# Patient Record
Sex: FEMALE | Employment: UNEMPLOYED | URBAN - METROPOLITAN AREA
[De-identification: names, ages, dates, MRNs, and addresses within clinical notes are randomized per-mention and may not be internally consistent; named-entity substitution may affect disease eponyms.]

---

## 2020-12-07 ENCOUNTER — RECORDS - HEALTHEAST (OUTPATIENT)
Dept: LAB | Facility: CLINIC | Age: 65
End: 2020-12-07

## 2020-12-08 LAB
ANION GAP SERPL CALCULATED.3IONS-SCNC: 11 MMOL/L (ref 5–18)
BNP SERPL-MCNC: 21 PG/ML (ref 0–101)
BUN SERPL-MCNC: 11 MG/DL (ref 8–22)
CALCIUM SERPL-MCNC: 8.4 MG/DL (ref 8.5–10.5)
CHLORIDE BLD-SCNC: 98 MMOL/L (ref 98–107)
CO2 SERPL-SCNC: 29 MMOL/L (ref 22–31)
CREAT SERPL-MCNC: 0.82 MG/DL (ref 0.6–1.1)
GFR SERPL CREATININE-BSD FRML MDRD: >60 ML/MIN/1.73M2
GLUCOSE BLD-MCNC: 278 MG/DL (ref 70–125)
HBA1C MFR BLD: 8.2 %
POTASSIUM BLD-SCNC: 3.7 MMOL/L (ref 3.5–5)
SODIUM SERPL-SCNC: 138 MMOL/L (ref 136–145)

## 2021-03-23 ENCOUNTER — RECORDS - HEALTHEAST (OUTPATIENT)
Dept: LAB | Facility: CLINIC | Age: 66
End: 2021-03-23

## 2021-03-24 LAB
ANION GAP SERPL CALCULATED.3IONS-SCNC: 13 MMOL/L (ref 5–18)
BNP SERPL-MCNC: 15 PG/ML (ref 0–101)
BUN SERPL-MCNC: 20 MG/DL (ref 8–22)
CALCIUM SERPL-MCNC: 8.6 MG/DL (ref 8.5–10.5)
CHLORIDE BLD-SCNC: 96 MMOL/L (ref 98–107)
CO2 SERPL-SCNC: 28 MMOL/L (ref 22–31)
CREAT SERPL-MCNC: 1.12 MG/DL (ref 0.6–1.1)
GFR SERPL CREATININE-BSD FRML MDRD: 49 ML/MIN/1.73M2
GLUCOSE BLD-MCNC: 232 MG/DL (ref 70–125)
POTASSIUM BLD-SCNC: 3.7 MMOL/L (ref 3.5–5)
SODIUM SERPL-SCNC: 137 MMOL/L (ref 136–145)

## 2021-04-08 ENCOUNTER — RECORDS - HEALTHEAST (OUTPATIENT)
Dept: LAB | Facility: CLINIC | Age: 66
End: 2021-04-08

## 2021-04-09 LAB
ANION GAP SERPL CALCULATED.3IONS-SCNC: 11 MMOL/L (ref 5–18)
BUN SERPL-MCNC: 20 MG/DL (ref 8–22)
CALCIUM SERPL-MCNC: 8.8 MG/DL (ref 8.5–10.5)
CHLORIDE BLD-SCNC: 95 MMOL/L (ref 98–107)
CO2 SERPL-SCNC: 33 MMOL/L (ref 22–31)
CREAT SERPL-MCNC: 1.16 MG/DL (ref 0.6–1.1)
GFR SERPL CREATININE-BSD FRML MDRD: 47 ML/MIN/1.73M2
GLUCOSE BLD-MCNC: 210 MG/DL (ref 70–125)
POTASSIUM BLD-SCNC: 3.2 MMOL/L (ref 3.5–5)
SODIUM SERPL-SCNC: 139 MMOL/L (ref 136–145)

## 2021-05-23 ENCOUNTER — RECORDS - HEALTHEAST (OUTPATIENT)
Dept: LAB | Facility: CLINIC | Age: 66
End: 2021-05-23

## 2021-05-24 LAB
ANION GAP SERPL CALCULATED.3IONS-SCNC: 14 MMOL/L (ref 5–18)
BUN SERPL-MCNC: 22 MG/DL (ref 8–22)
CALCIUM SERPL-MCNC: 8.2 MG/DL (ref 8.5–10.5)
CHLORIDE BLD-SCNC: 93 MMOL/L (ref 98–107)
CO2 SERPL-SCNC: 34 MMOL/L (ref 22–31)
CREAT SERPL-MCNC: 1.21 MG/DL (ref 0.6–1.1)
GFR SERPL CREATININE-BSD FRML MDRD: 45 ML/MIN/1.73M2
GLUCOSE BLD-MCNC: 126 MG/DL (ref 70–125)
POTASSIUM BLD-SCNC: 2.9 MMOL/L (ref 3.5–5)
SODIUM SERPL-SCNC: 141 MMOL/L (ref 136–145)

## 2021-06-16 ENCOUNTER — RECORDS - HEALTHEAST (OUTPATIENT)
Dept: LAB | Facility: CLINIC | Age: 66
End: 2021-06-16

## 2021-06-17 LAB
ANION GAP SERPL CALCULATED.3IONS-SCNC: 12 MMOL/L (ref 5–18)
BUN SERPL-MCNC: 16 MG/DL (ref 8–22)
CALCIUM SERPL-MCNC: 8.5 MG/DL (ref 8.5–10.5)
CHLORIDE BLD-SCNC: 99 MMOL/L (ref 98–107)
CO2 SERPL-SCNC: 29 MMOL/L (ref 22–31)
CREAT SERPL-MCNC: 0.95 MG/DL (ref 0.6–1.1)
GFR SERPL CREATININE-BSD FRML MDRD: 59 ML/MIN/1.73M2
GLUCOSE BLD-MCNC: 184 MG/DL (ref 70–125)
POTASSIUM BLD-SCNC: 3.4 MMOL/L (ref 3.5–5)
SODIUM SERPL-SCNC: 140 MMOL/L (ref 136–145)

## 2021-06-28 ENCOUNTER — RECORDS - HEALTHEAST (OUTPATIENT)
Dept: LAB | Facility: CLINIC | Age: 66
End: 2021-06-28

## 2021-06-28 LAB
ALBUMIN UR-MCNC: NEGATIVE G/DL
APPEARANCE UR: CLEAR
BACTERIA #/AREA URNS HPF: ABNORMAL /[HPF]
BILIRUB UR QL STRIP: NEGATIVE
COLOR UR AUTO: COLORLESS
GLUCOSE UR STRIP-MCNC: NEGATIVE MG/DL
HGB UR QL STRIP: NEGATIVE
HYALINE CASTS: 14 LPF
KETONES UR STRIP-MCNC: NEGATIVE MG/DL
LEUKOCYTE ESTERASE UR QL STRIP: ABNORMAL
MUCOUS THREADS #/AREA URNS LPF: PRESENT LPF
NITRATE UR QL: NEGATIVE
PH UR STRIP: 5 [PH] (ref 5–8)
RBC URINE: 5 HPF
SP GR UR STRIP: 1.01 (ref 1–1.03)
SQUAMOUS EPITHELIAL: 1 /HPF
UROBILINOGEN UR STRIP-ACNC: ABNORMAL
WBC URINE: 34 HPF

## 2021-07-01 LAB
BACTERIA SPEC CULT: ABNORMAL
BACTERIA SPEC CULT: ABNORMAL

## 2021-07-13 ENCOUNTER — LAB REQUISITION (OUTPATIENT)
Dept: LAB | Facility: CLINIC | Age: 66
End: 2021-07-13
Payer: COMMERCIAL

## 2021-07-13 DIAGNOSIS — N39.0 URINARY TRACT INFECTION, SITE NOT SPECIFIED: ICD-10-CM

## 2021-07-13 PROCEDURE — 87086 URINE CULTURE/COLONY COUNT: CPT | Mod: ORL | Performed by: FAMILY MEDICINE

## 2021-07-14 LAB — BACTERIA UR CULT: NO GROWTH

## 2023-11-14 ENCOUNTER — TRANSITIONAL CARE UNIT VISIT (OUTPATIENT)
Dept: GERIATRICS | Facility: CLINIC | Age: 68
End: 2023-11-14
Payer: COMMERCIAL

## 2023-11-14 ENCOUNTER — LAB REQUISITION (OUTPATIENT)
Dept: LAB | Facility: CLINIC | Age: 68
End: 2023-11-14
Payer: COMMERCIAL

## 2023-11-14 VITALS
RESPIRATION RATE: 18 BRPM | WEIGHT: 293 LBS | HEIGHT: 55 IN | OXYGEN SATURATION: 94 % | DIASTOLIC BLOOD PRESSURE: 55 MMHG | TEMPERATURE: 97.2 F | BODY MASS INDEX: 67.81 KG/M2 | HEART RATE: 81 BPM | SYSTOLIC BLOOD PRESSURE: 120 MMHG

## 2023-11-14 DIAGNOSIS — E11.52 TYPE 2 DIABETES MELLITUS WITH DIABETIC PERIPHERAL ANGIOPATHY WITH GANGRENE, UNSPECIFIED WHETHER LONG TERM INSULIN USE (H): ICD-10-CM

## 2023-11-14 DIAGNOSIS — I10 ESSENTIAL (PRIMARY) HYPERTENSION: ICD-10-CM

## 2023-11-14 DIAGNOSIS — E11.9 TYPE 2 DIABETES MELLITUS WITHOUT COMPLICATIONS (H): ICD-10-CM

## 2023-11-14 DIAGNOSIS — J44.9 CHRONIC OBSTRUCTIVE PULMONARY DISEASE, UNSPECIFIED COPD TYPE (H): ICD-10-CM

## 2023-11-14 DIAGNOSIS — Z89.512 STATUS POST BELOW-KNEE AMPUTATION OF LEFT LOWER EXTREMITY (H): ICD-10-CM

## 2023-11-14 DIAGNOSIS — I96 GANGRENE OF LEFT FOOT (H): Primary | ICD-10-CM

## 2023-11-14 DIAGNOSIS — I73.9 PAD (PERIPHERAL ARTERY DISEASE) (H): ICD-10-CM

## 2023-11-14 DIAGNOSIS — I50.30 HEART FAILURE WITH PRESERVED EJECTION FRACTION, NYHA CLASS II (H): ICD-10-CM

## 2023-11-14 PROBLEM — L03.116 CELLULITIS OF LEFT LOWER EXTREMITY: Status: ACTIVE | Noted: 2017-05-04

## 2023-11-14 PROBLEM — Z86.711 HX PULMONARY EMBOLISM: Status: ACTIVE | Noted: 2021-07-12

## 2023-11-14 PROBLEM — E11.621 DIABETIC FOOT ULCER (H): Status: ACTIVE | Noted: 2018-03-08

## 2023-11-14 PROBLEM — Z59.86 PATIENT CANNOT AFFORD MEDICATIONS: Status: ACTIVE | Noted: 2020-10-25

## 2023-11-14 PROBLEM — R53.1 GENERALIZED WEAKNESS: Status: ACTIVE | Noted: 2020-10-25

## 2023-11-14 PROBLEM — Z78.9 HEALTH CARE HOME, ACTIVE CARE COORDINATION: Chronic | Status: ACTIVE | Noted: 2021-09-23

## 2023-11-14 PROBLEM — I89.0 LYMPHEDEMA: Status: ACTIVE | Noted: 2019-11-22

## 2023-11-14 PROBLEM — R29.6 FREQUENT FALLS: Status: ACTIVE | Noted: 2021-07-12

## 2023-11-14 PROBLEM — E11.42 DM TYPE 2 WITH DIABETIC PERIPHERAL NEUROPATHY (H): Status: ACTIVE | Noted: 2021-07-12

## 2023-11-14 PROBLEM — F50.811 BINGE-EATING DISORDER, MODERATE: Status: ACTIVE | Noted: 2017-02-21

## 2023-11-14 PROBLEM — A41.9 SEPSIS (H): Status: ACTIVE | Noted: 2023-11-01

## 2023-11-14 PROBLEM — I26.99 PULMONARY EMBOLISM ON RIGHT (H): Status: ACTIVE | Noted: 2020-08-17

## 2023-11-14 PROBLEM — L03.90 CELLULITIS: Status: ACTIVE | Noted: 2023-11-01

## 2023-11-14 PROBLEM — L97.919: Status: ACTIVE | Noted: 2021-07-21

## 2023-11-14 PROBLEM — L97.509 DIABETIC FOOT ULCER (H): Status: ACTIVE | Noted: 2018-03-08

## 2023-11-14 PROBLEM — R80.9 MODERATELY INCREASED ALBUMINURIA: Status: ACTIVE | Noted: 2017-11-29

## 2023-11-14 PROBLEM — I70.222 CRITICAL LIMB ISCHEMIA OF LEFT LOWER EXTREMITY (H): Status: ACTIVE | Noted: 2023-11-05

## 2023-11-14 PROBLEM — A41.01 MSSA (METHICILLIN SUSCEPTIBLE STAPHYLOCOCCUS AUREUS) SEPTICEMIA (H): Status: ACTIVE | Noted: 2018-03-24

## 2023-11-14 PROBLEM — M86.9: Status: ACTIVE | Noted: 2018-03-28

## 2023-11-14 PROBLEM — U07.1 COVID-19: Status: ACTIVE | Noted: 2020-08-06

## 2023-11-14 PROBLEM — H26.9 CATARACT, BILATERAL: Status: ACTIVE | Noted: 2017-02-27

## 2023-11-14 PROBLEM — L03.119 CELLULITIS OF FOOT: Status: ACTIVE | Noted: 2018-03-29

## 2023-11-14 PROBLEM — M20.42 HAMMER TOE OF LEFT FOOT: Status: ACTIVE | Noted: 2018-10-05

## 2023-11-14 PROBLEM — I83.019: Status: ACTIVE | Noted: 2021-07-21

## 2023-11-14 PROBLEM — Z59.48 LACK OF ACCESS TO ADEQUATE FOOD: Status: ACTIVE | Noted: 2017-12-04

## 2023-11-14 PROBLEM — E13.69: Status: ACTIVE | Noted: 2018-03-28

## 2023-11-14 PROBLEM — J96.01 ACUTE RESPIRATORY FAILURE WITH HYPOXIA (H): Status: ACTIVE | Noted: 2020-08-17

## 2023-11-14 PROCEDURE — 99310 SBSQ NF CARE HIGH MDM 45: CPT | Performed by: NURSE PRACTITIONER

## 2023-11-14 RX ORDER — ACETAMINOPHEN 500 MG
1000 TABLET ORAL 3 TIMES DAILY
COMMUNITY

## 2023-11-14 RX ORDER — AMOXICILLIN 250 MG
1 CAPSULE ORAL 2 TIMES DAILY PRN
COMMUNITY
Start: 2023-11-13

## 2023-11-14 RX ORDER — INSULIN ASPART 100 [IU]/ML
INJECTION, SOLUTION INTRAVENOUS; SUBCUTANEOUS
COMMUNITY
Start: 2023-10-13 | End: 2023-11-20

## 2023-11-14 RX ORDER — BACITRACIN ZINC 500 [USP'U]/G
OINTMENT TOPICAL DAILY
COMMUNITY
Start: 2023-01-18 | End: 2023-11-14

## 2023-11-14 RX ORDER — POTASSIUM CHLORIDE 1500 MG/1
1 TABLET, EXTENDED RELEASE ORAL DAILY
COMMUNITY
Start: 2023-09-29 | End: 2023-12-18

## 2023-11-14 RX ORDER — ALBUTEROL SULFATE 90 UG/1
2 AEROSOL, METERED RESPIRATORY (INHALATION)
COMMUNITY
Start: 2022-04-20

## 2023-11-14 RX ORDER — HYDROCHLOROTHIAZIDE 25 MG/1
1 TABLET ORAL DAILY
COMMUNITY
Start: 2022-03-14

## 2023-11-14 RX ORDER — BENZONATATE 100 MG/1
1 CAPSULE ORAL 3 TIMES DAILY PRN
COMMUNITY
Start: 2023-09-29 | End: 2023-11-14

## 2023-11-14 RX ORDER — OXYCODONE HYDROCHLORIDE 5 MG/1
2.5 TABLET ORAL EVERY 4 HOURS PRN
COMMUNITY
Start: 2023-11-11 | End: 2023-11-15 | Stop reason: DRUGHIGH

## 2023-11-14 RX ORDER — NUTRITIONAL SUPPLEMENT
1 PACKET (EA) ORAL 3 TIMES DAILY
COMMUNITY

## 2023-11-14 RX ORDER — HYDROXYZINE PAMOATE 25 MG/1
25 CAPSULE ORAL EVERY 4 HOURS PRN
COMMUNITY
Start: 2023-11-11 | End: 2023-11-28

## 2023-11-14 RX ORDER — ATORVASTATIN CALCIUM 80 MG/1
40 TABLET, FILM COATED ORAL DAILY
COMMUNITY
Start: 2023-03-03

## 2023-11-14 RX ORDER — POLYETHYLENE GLYCOL 3350 17 G/17G
17 POWDER, FOR SOLUTION ORAL
COMMUNITY

## 2023-11-14 RX ORDER — ACETAMINOPHEN 325 MG/1
650 TABLET ORAL
COMMUNITY
Start: 2022-04-20 | End: 2023-11-14

## 2023-11-14 RX ORDER — LOPERAMIDE HCL 2 MG
2 CAPSULE ORAL
COMMUNITY
Start: 2023-11-05

## 2023-11-14 RX ORDER — NYSTATIN 100000 [USP'U]/G
POWDER TOPICAL
COMMUNITY
Start: 2023-11-13

## 2023-11-14 RX ORDER — FUROSEMIDE 20 MG
20 TABLET ORAL 2 TIMES DAILY
COMMUNITY
Start: 2022-09-20

## 2023-11-14 RX ORDER — BLOOD SUGAR DIAGNOSTIC
STRIP MISCELLANEOUS
COMMUNITY
Start: 2022-10-14

## 2023-11-14 RX ORDER — CLOPIDOGREL BISULFATE 75 MG/1
75 TABLET ORAL DAILY
COMMUNITY
Start: 2023-11-06

## 2023-11-14 RX ORDER — CARVEDILOL 12.5 MG/1
12.5 TABLET ORAL 2 TIMES DAILY WITH MEALS
COMMUNITY
Start: 2022-03-23

## 2023-11-14 NOTE — PROGRESS NOTES
"[facility] GERIATRICS    PRIMARY CARE PROVIDER AND CLINIC:  Lindsay Metz  Chief Complaint   Patient presents with    Hospital F/U      [place] Medical Record Number:  [id number]  Place of Service where encounter took place:  [facility] (TCU) [[id number]]    Nahed Courtney  is a 68 year old  (1955), admitted to the above facility from  [facility]. Hospital stay 11/8/2023 through 11/13/2023..     Her past medical history includes  Left gangrenous left heel s/p BKA (11/2023)  Type 2 diabetes  Peripheral artery disease   COPD   HFpEF      From 11/1/2023 to 11/5/2023, patient was hospitalized for left gangrenous heel wound.        On 11/8/2023, patient was readmitted for evaluation of left gangrenous foot wound and underwent a left BKA (11/9/2023).  She was placed on vancomycin and Zosyn and clindamycin.    Today pt was fatigued.  Denied pain.   BS elevated      CODE STATUS/ADVANCE DIRECTIVES DISCUSSION:  No Order  CPR/Full code   ALLERGIES:   Allergies   Allergen Reactions    Chicken-Derived Products (Egg) Nausea     Other Reaction(s): Gastrointestinal    Lisinopril Cough and Nausea and Vomiting     PN: Other reaction(s): Cough, Vomiting    Losartan Cough    Miconazole Dermatitis     PN: Other reaction(s): Dermatitis    Morphine And Related Other (See Comments)     \"felt like I was dying when I took it\"    Vicodin [Hydrocodone-Acetaminophen]     Egg White (Egg Protein)      Other Reaction(s): Flu Like Symptoms    Hydrocodone-Acetaminophen Anxiety      PAST MEDICAL HISTORY: No past medical history on file.   PAST SURGICAL HISTORY:   has a past surgical history that includes IR Lower Extremity Angiogram Left (3/28/2018).  FAMILY HISTORY: family history is not on file.  SOCIAL HISTORY:   reports that she has quit smoking. She does not have any smokeless tobacco history on file. She reports that she does not drink alcohol.  Patient's living condition: lives " alone    Post Discharge Medication Reconciliation Status:   MED REC REQUIRED  Post Medication Reconciliation Status:   no changes       Current Outpatient Medications   Medication Sig    acetaminophen (TYLENOL) 500 MG tablet Take 1,000 mg by mouth every 6 hours as needed    albuterol (PROAIR HFA/PROVENTIL HFA/VENTOLIN HFA) 108 (90 Base) MCG/ACT inhaler Inhale 2 puffs into the lungs    aspirin (ASPIR-81) 81 MG EC tablet Take 81 mg by mouth daily.    atorvastatin (LIPITOR) 80 MG tablet Take 40 mg by mouth daily    blood glucose (ONETOUCH VERIO IQ) test strip Test 4 times per day ** Pharmacy allowed to substitute per Patients Insurance    carvedilol (COREG) 12.5 MG tablet Take 12.5 mg by mouth 2 times daily (with meals)    clopidogrel (PLAVIX) 75 MG tablet Take 75 mg by mouth daily    Fluticasone-Umeclidin-Vilant (TRELEGY ELLIPTA) 100-62.5-25 MCG/ACT oral inhaler Inhale 1 Inhalation into the lungs daily    furosemide (LASIX) 20 MG tablet Take 1 tablet by mouth daily    gabapentin (NEURONTIN) 600 MG tablet Take 600 mg by mouth 3 times daily.    hydrochlorothiazide (HYDRODIURIL) 25 MG tablet Take 1 tablet by mouth daily    hydrOXYzine (VISTARIL) 25 MG capsule Take 25 mg by mouth every 4 hours as needed for anxiety    Insulin Aspart FlexPen 100 UNIT/ML SOPN Inject Subcutaneous 3 times daily (with meals) f 0 - 119 = 0 units;   120 - 149 = 1 unit;   150 - 199 = 2 units;   200 - 249 = 4 units;   250 - 299 = 6 Units;   300 - 349 = 8 units;   350 - 999 = 10 units    At HS   0 - 149 = 0 units;   150 - 199 = 1 unit;   200 - 249 = 2 units;   250 - 299 = 3 units;   300 - 349 = 4 units;   350 - 999 = 5 units    insulin glargine (LANTUS PEN) 100 UNIT/ML pen Inject 40 Units Subcutaneous at bedtime    loperamide (IMODIUM) 2 MG capsule Take 2 mg by mouth    Multiple Vitamins-Minerals (JUAN MULTI WOMEN) TBCR Take  by mouth daily.    nitroglycerin (NITROSTAT) 0.4 MG SL tablet Place  under the tongue every 5 minutes as needed.    nystatin  "(MYCOSTATIN) 989235 UNIT/GM external powder Apply to skin twice a day.    oxyCODONE (ROXICODONE) 5 MG tablet Take 2.5 mg by mouth every 4 hours as needed for breakthrough pain    polyethylene glycol (MIRALAX) 17 g packet Take 17 g by mouth    potassium chloride ER (KLOR-CON M) 20 MEQ CR tablet Take 1 tablet by mouth daily    semaglutide (OZEMPIC) 2 MG/3ML pen 0.25 mg weekly on Sundays until seen by endocrinology   carrie for patient to use home dose of Ozempic.    senna-docusate (SENOKOT-S/PERICOLACE) 8.6-50 MG tablet Take 1 tablet by mouth 2 times daily as needed for constipation     No current facility-administered medications for this visit.       ROS:  4 point ROS including Respiratory, CV, GI and , other than that noted in the HPI,  is negative    Vitals:  /55   Pulse 81   Temp 97.2  F (36.2  C)   Resp 18   Ht (!) 0.14 m (5.5\")   Wt 140.7 kg (310 lb 3.2 oz)   SpO2 94%   BMI 7209.73 kg/m    Exam:  GENERAL APPEARANCE:  in no distress, morbidly obese, groggy  RESP:  lungs clear to auscultation , no respiratory distress  CV:  Palpation and auscultation of heart done , rate-normal  PSYCH:  oriented X 3    Lab/Diagnostic data:          ASSESSMENT/PLAN:    (I96) Gangrene of left foot (H)  (primary encounter diagnosis)  (Z89.512) Status post below-knee amputation of left lower extremity (H)  Comment: Occurring in 11/2023.  Received antibiotics after the surgery.  On 1/11/2023 developed fever, blood cultures negative.   Continue atorvastatin, Plavix and aspirin.     For pain she has oxycodone and hydroxyzine for anxiety and itching.    Plan:   Non weight bearing  Ok to shower. Keep splint and/or incision site clean, dry, intact. Recommend using press'n seal cling wrap to wrap around splint or a shower guard to cover your incision in order to keep dry. Do not immerse operative extremity in water.   Elevate left leg as needed.   Change dressing to left BKA every other day and PRN. Use adaptic and dry gauze "   Clamshell protector to left lower extremity. Remove protector and roll down stocking 3 times daily and as needed to check skin integrity.   Follow up appointment will be with [facility] on November 29 at 1:30 pm in [place] at [address], [address], [address], [place], [place] with Dr. Hamilton. Please call [phone] with questions.   Therapy to evaluate and treat   CBC, BMP, hgb A1c on 11/15  Pt having lots of fatigue with oxycodone therefore will add tylenol and lengthen the time it can be given.     (E11.52) Type 2 diabetes mellitus with diabetic peripheral angiopathy with gangrene, unspecified whether long term insulin use (H)  Comment: Chronic.  Currently taking Semaglutide, insulin Lantus and aspart with sliding scale.   For secondary prevention she is taking aspirin and statin,  she is allergic to ACEI and ARBS.    Plan: follow up with endocrinology in 4 weeks  Hgb A1c  Pt having elevated BS.  May need to consider aspart at meals.  Was on Glargine 48 units Prior to hospital admission      (I73.9) PAD (peripheral artery disease) (H24)  Comment: chronic.   Currently taking Plavix and aspirin  Plan: Continue with plan of care no changes at this time, adjustment as needed      (J44.9) Chronic obstructive pulmonary disease, unspecified COPD type (H)  Comment: chronic.  Currently taking inhalers  Plan: Continue with plan of care no changes at this time, adjustment as needed      (I50.30) Heart failure with preserved ejection fraction, NYHA class II (H)  Comment: Chronic.   Currently taking furosemide  Plan: Daily weight  BMP          Electronically signed by:      ALONZO Romero CNP on 11/14/2023 at 7:07 PM        45 minutes was spent reviewing medical record from [facility], assessing patient, reviewing medical notes from previous primary care provider, talking with pt and answering their questions/concerns, coordinating above plan of care with nursing , SW,  therapy and dietitian and patient and reviewed medications with patient and counseled pt. on above plan of care.  Counseled on medications and side effects.

## 2023-11-14 NOTE — LETTER
"    11/14/2023        RE: Nahed Courtney  [address]  [address]  [place] [address]        [facility] GERIATR[facility]    PRIMARY CARE PROVIDER AND CLINIC:  Lindsay Metz  Chief Complaint   Patient presents with     Hospital F/U      [place] Medical Record Number:  [id number]  Place of Service where encounter took place:  [facility] (U) [[id number]]    Nahed Courtney  is a 68 year old  (1955), admitted to the above facility from  [facility]. Hospital stay 11/8/2023 through 11/13/2023..     Her past medical history includes  Left gangrenous left heel s/p BKA (11/2023)  Type 2 diabetes  Peripheral artery disease   COPD   HFpEF      From 11/1/2023 to 11/5/2023, patient was hospitalized for left gangrenous heel wound.        On 11/8/2023, patient was readmitted for evaluation of left gangrenous foot wound and underwent a left BKA (11/9/2023).  She was placed on vancomycin and Zosyn and clindamycin.    Today pt was fatigued.  Denied pain.   BS elevated      CODE STATUS/ADVANCE DIRECTIVES DISCUSSION:  No Order  CPR/Full code   ALLERGIES:   Allergies   Allergen Reactions     Chicken-Derived Products (Egg) Nausea     Other Reaction(s): Gastrointestinal     Lisinopril Cough and Nausea and Vomiting     PN: Other reaction(s): Cough, Vomiting     Losartan Cough     Miconazole Dermatitis     PN: Other reaction(s): Dermatitis     Morphine And Related Other (See Comments)     \"felt like I was dying when I took it\"     Vicodin [Hydrocodone-Acetaminophen]      Egg White (Egg Protein)      Other Reaction(s): Flu Like Symptoms     Hydrocodone-Acetaminophen Anxiety      PAST MEDICAL HISTORY: No past medical history on file.   PAST SURGICAL HISTORY:   has a past surgical history that includes IR Lower Extremity Angiogram Left (3/28/2018).  FAMILY HISTORY: family history is not on file.  SOCIAL HISTORY:   reports that she has quit smoking. She does not " have any smokeless tobacco history on file. She reports that she does not drink alcohol.  Patient's living condition: lives alone    Post Discharge Medication Reconciliation Status:   MED REC REQUIRED  Post Medication Reconciliation Status:   no changes       Current Outpatient Medications   Medication Sig     acetaminophen (TYLENOL) 500 MG tablet Take 1,000 mg by mouth every 6 hours as needed     albuterol (PROAIR HFA/PROVENTIL HFA/VENTOLIN HFA) 108 (90 Base) MCG/ACT inhaler Inhale 2 puffs into the lungs     aspirin (ASPIR-81) 81 MG EC tablet Take 81 mg by mouth daily.     atorvastatin (LIPITOR) 80 MG tablet Take 40 mg by mouth daily     blood glucose (ONETOUCH VERIO IQ) test strip Test 4 times per day ** Pharmacy allowed to substitute per Patients Insurance     carvedilol (COREG) 12.5 MG tablet Take 12.5 mg by mouth 2 times daily (with meals)     clopidogrel (PLAVIX) 75 MG tablet Take 75 mg by mouth daily     Fluticasone-Umeclidin-Vilant (TRELEGY ELLIPTA) 100-62.5-25 MCG/ACT oral inhaler Inhale 1 Inhalation into the lungs daily     furosemide (LASIX) 20 MG tablet Take 1 tablet by mouth daily     gabapentin (NEURONTIN) 600 MG tablet Take 600 mg by mouth 3 times daily.     hydrochlorothiazide (HYDRODIURIL) 25 MG tablet Take 1 tablet by mouth daily     hydrOXYzine (VISTARIL) 25 MG capsule Take 25 mg by mouth every 4 hours as needed for anxiety     Insulin Aspart FlexPen 100 UNIT/ML SOPN Inject Subcutaneous 3 times daily (with meals) f 0 - 119 = 0 units;   120 - 149 = 1 unit;   150 - 199 = 2 units;   200 - 249 = 4 units;   250 - 299 = 6 Units;   300 - 349 = 8 units;   350 - 999 = 10 units    At HS   0 - 149 = 0 units;   150 - 199 = 1 unit;   200 - 249 = 2 units;   250 - 299 = 3 units;   300 - 349 = 4 units;   350 - 999 = 5 units     insulin glargine (LANTUS PEN) 100 UNIT/ML pen Inject 40 Units Subcutaneous at bedtime     loperamide (IMODIUM) 2 MG capsule Take 2 mg by mouth     Multiple Vitamins-Minerals (JUAN MULTI  "WOMEN) TBCR Take  by mouth daily.     nitroglycerin (NITROSTAT) 0.4 MG SL tablet Place  under the tongue every 5 minutes as needed.     nystatin (MYCOSTATIN) 597195 UNIT/GM external powder Apply to skin twice a day.     oxyCODONE (ROXICODONE) 5 MG tablet Take 2.5 mg by mouth every 4 hours as needed for breakthrough pain     polyethylene glycol (MIRALAX) 17 g packet Take 17 g by mouth     potassium chloride ER (KLOR-CON M) 20 MEQ CR tablet Take 1 tablet by mouth daily     semaglutide (OZEMPIC) 2 MG/3ML pen 0.25 mg weekly on Sundays until seen by endocrinology   carrie for patient to use home dose of Ozempic.     senna-docusate (SENOKOT-S/PERICOLACE) 8.6-50 MG tablet Take 1 tablet by mouth 2 times daily as needed for constipation     No current facility-administered medications for this visit.       ROS:  4 point ROS including Respiratory, CV, GI and , other than that noted in the HPI,  is negative    Vitals:  /55   Pulse 81   Temp 97.2  F (36.2  C)   Resp 18   Ht (!) 0.14 m (5.5\")   Wt 140.7 kg (310 lb 3.2 oz)   SpO2 94%   BMI 7209.73 kg/m    Exam:  GENERAL APPEARANCE:  in no distress, morbidly obese, groggy  RESP:  lungs clear to auscultation , no respiratory distress  CV:  Palpation and auscultation of heart done , rate-normal  PSYCH:  oriented X 3    Lab/Diagnostic data:          ASSESSMENT/PLAN:    (I96) Gangrene of left foot (H)  (primary encounter diagnosis)  (Z89.512) Status post below-knee amputation of left lower extremity (H)  Comment: Occurring in 11/2023.  Received antibiotics after the surgery.  On 1/11/2023 developed fever, blood cultures negative.   Continue atorvastatin, Plavix and aspirin.     For pain she has oxycodone and hydroxyzine for anxiety and itching.    Plan:   Non weight bearing  Ok to shower. Keep splint and/or incision site clean, dry, intact. Recommend using press'n seal cling wrap to wrap around splint or a shower guard to cover your incision in order to keep dry. Do not " immerse operative extremity in water.   Elevate left leg as needed.   Change dressing to left BKA every other day and PRN. Use adaptic and dry gauze   Clamshell protector to left lower extremity. Remove protector and roll down stocking 3 times daily and as needed to check skin integrity.   Follow up appointment will be with [facility] on November 29 at 1:30 pm in [place] at [address], [address], [address], [place], [place] with Dr. Hamilton. Please call [phone] with questions.   Therapy to evaluate and treat   CBC, BMP, hgb A1c on 11/15  Pt having lots of fatigue with oxycodone therefore will add tylenol and lengthen the time it can be given.     (E11.52) Type 2 diabetes mellitus with diabetic peripheral angiopathy with gangrene, unspecified whether long term insulin use (H)  Comment: Chronic.  Currently taking Semaglutide, insulin Lantus and aspart with sliding scale.   For secondary prevention she is taking aspirin and statin,  she is allergic to ACEI and ARBS.    Plan: follow up with endocrinology in 4 weeks  Hgb A1c  Pt having elevated BS.  May need to consider aspart at meals.  Was on Glargine 48 units Prior to hospital admission      (I73.9) PAD (peripheral artery disease) (H24)  Comment: chronic.   Currently taking Plavix and aspirin  Plan: Continue with plan of care no changes at this time, adjustment as needed      (J44.9) Chronic obstructive pulmonary disease, unspecified COPD type (H)  Comment: chronic.  Currently taking inhalers  Plan: Continue with plan of care no changes at this time, adjustment as needed      (I50.30) Heart failure with preserved ejection fraction, NYHA class II (H)  Comment: Chronic.   Currently taking furosemide  Plan: Daily weight  BMP          Electronically signed by:      ALONZO Romero CNP on 11/14/2023 at 7:07 PM        45 minutes was spent reviewing medical record from [facility], assessing patient, reviewing medical notes  from previous primary care provider, talking with pt and answering their questions/concerns, coordinating above plan of care with nursing , SW, therapy and dietitian and patient and reviewed medications with patient and counseled pt. on above plan of care.  Counseled on medications and side effects.                    Sincerely,        ALONZO Romero CNP

## 2023-11-15 ENCOUNTER — DOCUMENTATION ONLY (OUTPATIENT)
Dept: GERIATRICS | Facility: CLINIC | Age: 68
End: 2023-11-15
Payer: COMMERCIAL

## 2023-11-15 DIAGNOSIS — M25.559 HIP JOINT PAIN: Primary | ICD-10-CM

## 2023-11-15 LAB
ANION GAP SERPL CALCULATED.3IONS-SCNC: 12 MMOL/L (ref 7–15)
BUN SERPL-MCNC: 15.5 MG/DL (ref 8–23)
CALCIUM SERPL-MCNC: 8.9 MG/DL (ref 8.8–10.2)
CHLORIDE SERPL-SCNC: 94 MMOL/L (ref 98–107)
CREAT SERPL-MCNC: 0.93 MG/DL (ref 0.51–0.95)
DEPRECATED HCO3 PLAS-SCNC: 32 MMOL/L (ref 22–29)
EGFRCR SERPLBLD CKD-EPI 2021: 67 ML/MIN/1.73M2
ERYTHROCYTE [DISTWIDTH] IN BLOOD BY AUTOMATED COUNT: 18.1 % (ref 10–15)
GLUCOSE SERPL-MCNC: 186 MG/DL (ref 70–99)
HBA1C MFR BLD: 9.2 %
HCT VFR BLD AUTO: 34.4 % (ref 35–47)
HGB BLD-MCNC: 10.5 G/DL (ref 11.7–15.7)
MCH RBC QN AUTO: 27.6 PG (ref 26.5–33)
MCHC RBC AUTO-ENTMCNC: 30.5 G/DL (ref 31.5–36.5)
MCV RBC AUTO: 91 FL (ref 78–100)
PLATELET # BLD AUTO: 334 10E3/UL (ref 150–450)
POTASSIUM SERPL-SCNC: 3.5 MMOL/L (ref 3.4–5.3)
RBC # BLD AUTO: 3.8 10E6/UL (ref 3.8–5.2)
SODIUM SERPL-SCNC: 138 MMOL/L (ref 135–145)
WBC # BLD AUTO: 9.8 10E3/UL (ref 4–11)

## 2023-11-15 PROCEDURE — P9604 ONE-WAY ALLOW PRORATED TRIP: HCPCS | Mod: ORL | Performed by: NURSE PRACTITIONER

## 2023-11-15 PROCEDURE — 80048 BASIC METABOLIC PNL TOTAL CA: CPT | Mod: ORL | Performed by: NURSE PRACTITIONER

## 2023-11-15 PROCEDURE — 36415 COLL VENOUS BLD VENIPUNCTURE: CPT | Mod: ORL | Performed by: NURSE PRACTITIONER

## 2023-11-15 PROCEDURE — 83036 HEMOGLOBIN GLYCOSYLATED A1C: CPT | Mod: ORL | Performed by: NURSE PRACTITIONER

## 2023-11-15 PROCEDURE — 85027 COMPLETE CBC AUTOMATED: CPT | Mod: ORL | Performed by: NURSE PRACTITIONER

## 2023-11-15 RX ORDER — OXYCODONE HYDROCHLORIDE 5 MG/1
TABLET ORAL
Qty: 60 TABLET | Refills: 0 | Status: SHIPPED | OUTPATIENT
Start: 2023-11-15 | End: 2023-12-04

## 2023-11-15 RX ORDER — OXYCODONE HYDROCHLORIDE 5 MG/1
TABLET ORAL
COMMUNITY
Start: 2023-11-15 | End: 2023-11-15

## 2023-11-16 ENCOUNTER — TRANSITIONAL CARE UNIT VISIT (OUTPATIENT)
Dept: GERIATRICS | Facility: CLINIC | Age: 68
End: 2023-11-16
Payer: COMMERCIAL

## 2023-11-16 VITALS
TEMPERATURE: 97.2 F | OXYGEN SATURATION: 94 % | RESPIRATION RATE: 18 BRPM | HEIGHT: 65 IN | BODY MASS INDEX: 48.82 KG/M2 | WEIGHT: 293 LBS | DIASTOLIC BLOOD PRESSURE: 65 MMHG | SYSTOLIC BLOOD PRESSURE: 129 MMHG | HEART RATE: 73 BPM

## 2023-11-16 DIAGNOSIS — Z79.4 LONG TERM (CURRENT) USE OF INSULIN (H): ICD-10-CM

## 2023-11-16 DIAGNOSIS — E11.52 TYPE 2 DIABETES MELLITUS WITH DIABETIC PERIPHERAL ANGIOPATHY AND GANGRENE, WITH LONG-TERM CURRENT USE OF INSULIN (H): Primary | ICD-10-CM

## 2023-11-16 DIAGNOSIS — Z79.4 TYPE 2 DIABETES MELLITUS WITH DIABETIC PERIPHERAL ANGIOPATHY AND GANGRENE, WITH LONG-TERM CURRENT USE OF INSULIN (H): Primary | ICD-10-CM

## 2023-11-16 DIAGNOSIS — R73.9 ELEVATED BLOOD SUGAR: ICD-10-CM

## 2023-11-16 PROCEDURE — 99309 SBSQ NF CARE MODERATE MDM 30: CPT | Performed by: NURSE PRACTITIONER

## 2023-11-16 NOTE — LETTER
"    11/16/2023        RE: Nahed Courtney  [address]  [facility] [address]        [facility]    Chief Complaint   Patient presents with     Blood Sugar     HPI:  Nahed Courtney is a 68 year old  (1955), who is being seen today for an episodic care visit at: [facility] ([facility]) [[id number]].     Pt's BS are elevated.    Weight is down 5 pounds since admission  Receives 40 units Lantus at bedtime and sliding scale with Novolog        Allergies, and PMH/PSH reviewed in [facility] today.  REVIEW OF SYSTEMS:  4 point ROS including Respiratory, CV, GI and , other than that noted in the HPI,  is negative    Objective:   /65   Pulse 73   Temp 97.2  F (36.2  C)   Resp 18   Ht 1.651 m (5' 5\")   Wt 138.6 kg (305 lb 8 oz)   SpO2 94%   BMI 50.84 kg/m    GENERAL APPEARANCE:  Alert, morbidly obese  RESP:  no respiratory distress  PSYCH:  normal insight, judgement and memory    Most Recent 3 CBC's:  Recent Labs   Lab Test 11/15/23  0611   WBC 9.8   HGB 10.5*   MCV 91        Most Recent 3 BMP's:  Recent Labs   Lab Test 11/15/23  0611 06/17/21  0730 05/24/21  0655    140 141   POTASSIUM 3.5 3.4* 2.9*   CHLORIDE 94* 99 93*   CO2 32* 29 34*   BUN 15.5 16 22   CR 0.93 0.95 1.21*   ANIONGAP 12 12 14   ROBINA 8.9 8.5 8.2*   * 184* 126*       Assessment/Plan:  (Z79.4) Long term (current) use of insulin (H)  (primary encounter diagnosis)  (E11.52,  Z79.4) Type 2 diabetes mellitus with diabetic peripheral angiopathy and gangrene, with long-term current use of insulin (H)  (R73.9) elevated blood sugar  Comment: Chronic.  Blood sugars elevated.  Currently taking Semaglutide (on hold during U admission), insulin Lantus and aspart with sliding scale.   For secondary prevention she is taking aspirin and statin,  she is allergic to ACEI and ARBS.    Plan:   Dr. Darya Endocrinology  12/19/23 @ 2:00 PM   Stop current Glargine 40 units at HS  Start Glargine 44 " units at HS  Start Was on Glargine 48 units Prior to hospital admission    MED REC REQUIRED  Post Medication Reconciliation Status:  Discharge medications reconciled, continue medications without change        Electronically signed by:      ALONZO Romero CNP on 11/16/2023 at 5:59 PM           Sincerely,        ALONZO Romero CNP

## 2023-11-16 NOTE — PROGRESS NOTES
"[facility] GERIATRICS    Chief Complaint   Patient presents with    Blood Sugar     HPI:  Nahed Courtney is a 68 year old  (1955), who is being seen today for an episodic care visit at: [facility] ([facility]) [[id number]].     Pt's BS are elevated.    Weight is down 5 pounds since admission  Receives 40 units Lantus at bedtime and sliding scale with Novolog        Allergies, and PMH[facility]PSH [facility].  REVIEW OF SYSTEMS:  4 point ROS including Respiratory, CV, GI and , other than that noted in the HPI,  is negative    Objective:   /65   Pulse 73   Temp 97.2  F (36.2  C)   Resp 18   Ht 1.651 m (5' 5\")   Wt 138.6 kg (305 lb 8 oz)   SpO2 94%   BMI 50.84 kg/m    GENERAL APPEARANCE:  Alert, morbidly obese  RESP:  no respiratory distress  PSYCH:  normal insight, judgement and memory    Most Recent 3 CBC's:  Recent Labs   Lab Test 11/15/23  0611   WBC 9.8   HGB 10.5*   MCV 91        Most Recent 3 BMP's:  Recent Labs   Lab Test 11/15/23  0611 06/17/21  0730 05/24/21  0655    140 141   POTASSIUM 3.5 3.4* 2.9*   CHLORIDE 94* 99 93*   CO2 32* 29 34*   BUN 15.5 16 22   CR 0.93 0.95 1.21*   ANIONGAP 12 12 14   ROBINA 8.9 8.5 8.2*   * 184* 126*       Assessment/Plan:  (Z79.4) Long term (current) use of insulin (H)  (primary encounter diagnosis)  (E11.52,  Z79.4) Type 2 diabetes mellitus with diabetic peripheral angiopathy and gangrene, with long-term current use of insulin (H)  (R73.9) elevated blood sugar  Comment: Chronic.  Blood sugars elevated.  Currently taking Semaglutide (on hold during U admission), insulin Lantus and aspart with sliding scale.   For secondary prevention she is taking aspirin and statin,  she is allergic to ACEI and ARBS.    Plan:   Dr. Lackey Endocrinology  12/19/23 @ 2:00 PM   Stop current Glargine 40 units at HS  Start Glargine 44 units at HS  Start Was on Glargine 48 units Prior to hospital admission    MED REC REQUIRED  Post Medication " Reconciliation Status:  Discharge medications reconciled, continue medications without change        Electronically signed by:      ALONZO Romero CNP on 11/16/2023 at 5:59 PM

## 2023-11-20 ENCOUNTER — TRANSITIONAL CARE UNIT VISIT (OUTPATIENT)
Dept: GERIATRICS | Facility: CLINIC | Age: 68
End: 2023-11-20
Payer: COMMERCIAL

## 2023-11-20 VITALS
TEMPERATURE: 97.2 F | HEART RATE: 79 BPM | HEIGHT: 65 IN | SYSTOLIC BLOOD PRESSURE: 152 MMHG | OXYGEN SATURATION: 95 % | WEIGHT: 293 LBS | DIASTOLIC BLOOD PRESSURE: 68 MMHG | RESPIRATION RATE: 18 BRPM | BODY MASS INDEX: 48.82 KG/M2

## 2023-11-20 DIAGNOSIS — Z79.4 TYPE 2 DIABETES MELLITUS WITH DIABETIC PERIPHERAL ANGIOPATHY AND GANGRENE, WITH LONG-TERM CURRENT USE OF INSULIN (H): Primary | ICD-10-CM

## 2023-11-20 DIAGNOSIS — R73.9 ELEVATED BLOOD SUGAR: ICD-10-CM

## 2023-11-20 DIAGNOSIS — G47.00 INSOMNIA, UNSPECIFIED TYPE: ICD-10-CM

## 2023-11-20 DIAGNOSIS — Z79.4 LONG TERM (CURRENT) USE OF INSULIN (H): ICD-10-CM

## 2023-11-20 DIAGNOSIS — Z89.512 STATUS POST BELOW-KNEE AMPUTATION OF LEFT LOWER EXTREMITY (H): ICD-10-CM

## 2023-11-20 DIAGNOSIS — E11.52 TYPE 2 DIABETES MELLITUS WITH DIABETIC PERIPHERAL ANGIOPATHY AND GANGRENE, WITH LONG-TERM CURRENT USE OF INSULIN (H): Primary | ICD-10-CM

## 2023-11-20 PROCEDURE — 99309 SBSQ NF CARE MODERATE MDM 30: CPT | Performed by: NURSE PRACTITIONER

## 2023-11-20 RX ORDER — LANOLIN ALCOHOL/MO/W.PET/CERES
3 CREAM (GRAM) TOPICAL AT BEDTIME
Start: 2023-11-20

## 2023-11-20 RX ORDER — INSULIN ASPART 100 [IU]/ML
10 INJECTION, SOLUTION INTRAVENOUS; SUBCUTANEOUS
Start: 2023-11-20 | End: 2023-11-28

## 2023-11-20 NOTE — LETTER
11/20/2023        RE: Nahed Courtney  [address]  [facility] [address]         [facility]        Visit Type: Blood Sugar      Facility:   [facility] ([facility]) [[id number]]         History of Present Illness: Nahed Courtney is a 68 year old female     Her past medical history includes  Left gangrenous left heel s/p BKA (11/2023)  Type 2 diabetes  Peripheral artery disease   COPD   HFpEF    BS remains elevated despite increasing insulin and giving her 5 units for each meal.   Semaglutide on hold while in facility.  She only took this one time prior to admission.  Weight decreasing   VS stable.    Pt would like her insulin increased    Current Outpatient Medications   Medication Sig Dispense Refill     acetaminophen (TYLENOL) 500 MG tablet Take 1,000 mg by mouth every 6 hours as needed       albuterol (PROAIR HFA/PROVENTIL HFA/VENTOLIN HFA) 108 (90 Base) MCG/ACT inhaler Inhale 2 puffs into the lungs       arginine (ARGINAID) PACK Take 1 packet by mouth 3 times daily       aspirin (ASPIR-81) 81 MG EC tablet Take 81 mg by mouth daily.       atorvastatin (LIPITOR) 80 MG tablet Take 40 mg by mouth daily       blood glucose (ONETOUCH VERIO IQ) test strip Test 4 times per day ** Pharmacy allowed to substitute per Patients Insurance       carvedilol (COREG) 12.5 MG tablet Take 12.5 mg by mouth 2 times daily (with meals)       clopidogrel (PLAVIX) 75 MG tablet Take 75 mg by mouth daily       Fluticasone-Umeclidin-Vilant (TRELEGY ELLIPTA) 100-62.5-25 MCG/ACT oral inhaler Inhale 1 Inhalation into the lungs daily       furosemide (LASIX) 20 MG tablet Take 1 tablet by mouth daily       gabapentin (NEURONTIN) 600 MG tablet Take 600 mg by mouth 3 times daily.       hydrochlorothiazide (HYDRODIURIL) 25 MG tablet Take 1 tablet by mouth daily       hydrOXYzine (VISTARIL) 25 MG capsule Take 25 mg by mouth every 4 hours as needed for anxiety       Insulin Aspart FlexPen 100  "UNIT/ML SOPN Inject Subcutaneous 3 times daily (with meals) f 0 - 119 = 0 units;   120 - 149 = 1 unit;   150 - 199 = 2 units;   200 - 249 = 4 units;   250 - 299 = 6 Units;   300 - 349 = 8 units;   350 - 999 = 10 units    At HS   0 - 149 = 0 units;   150 - 199 = 1 unit;   200 - 249 = 2 units;   250 - 299 = 3 units;   300 - 349 = 4 units;   350 - 999 = 5 units       insulin glargine (LANTUS PEN) 100 UNIT/ML pen Inject 44 Units Subcutaneous at bedtime       loperamide (IMODIUM) 2 MG capsule Take 2 mg by mouth       Multiple Vitamins-Minerals (JUAN MULTI WOMEN) TBCR Take  by mouth daily.       nitroglycerin (NITROSTAT) 0.4 MG SL tablet Place  under the tongue every 5 minutes as needed.       nystatin (MYCOSTATIN) 005682 UNIT/GM external powder Apply to skin twice a day.       oxyCODONE (ROXICODONE) 5 MG tablet Take 2.5mg Q 6 hours PRN x 5 days then 5mg Q 12 hours PRN thereafter. 60 tablet 0     polyethylene glycol (MIRALAX) 17 g packet Take 17 g by mouth       potassium chloride ER (KLOR-CON M) 20 MEQ CR tablet Take 1 tablet by mouth daily       semaglutide (OZEMPIC) 2 MG/3ML pen 0.25 mg weekly on Sundays until seen by endocrinology   carrie for patient to use home dose of Ozempic. (Patient not taking: Reported on 11/16/2023)       senna-docusate (SENOKOT-S/PERICOLACE) 8.6-50 MG tablet Take 1 tablet by mouth 2 times daily as needed for constipation         ALLERGIES:Chicken-derived products (egg), Lisinopril, Losartan, Miconazole, Morphine and related, Vicodin [hydrocodone-acetaminophen], Egg white (egg protein), and Hydrocodone-acetaminophen    Vitals:  BP (!) 152/68   Pulse 79   Temp 97.2  F (36.2  C)   Resp 18   Ht 1.651 m (5' 5\")   Wt 136.3 kg (300 lb 6.4 oz)   SpO2 95%   BMI 49.99 kg/m    Body mass index is 49.99 kg/m .    Review of Systems   All other systems reviewed and are negative.       Physical Exam  Vitals and nursing note reviewed.   Constitutional:       Appearance: She is obese.   Skin:       "   Neurological:      Mental Status: She is alert.           Labs:     Most Recent 3 CBC's:  Recent Labs   Lab Test 11/15/23  0611   WBC 9.8   HGB 10.5*   MCV 91        Most Recent 3 BMP's:  Recent Labs   Lab Test 11/15/23  0611 06/17/21  0730 05/24/21  0655    140 141   POTASSIUM 3.5 3.4* 2.9*   CHLORIDE 94* 99 93*   CO2 32* 29 34*   BUN 15.5 16 22   CR 0.93 0.95 1.21*   ANIONGAP 12 12 14   ROBINA 8.9 8.5 8.2*   * 184* 126*       Assessment/Plan:  (Z89.512) Status post below-knee amputation of left lower extremity (H)  Comment: Occurring in 11/2023.  Received antibiotics after the surgery.  On 1/11/2023 developed fever, blood cultures negative.   Continue atorvastatin, Plavix and aspirin.     For pain she has oxycodone and hydroxyzine for anxiety and itching.  Incision is CDI with staples intact  Plan: follow up on 11/29 at [facility] to see Dr. Faustin - writer expects staples out at that time.      (E11.52) Type 2 diabetes mellitus with diabetic peripheral angiopathy with gangrene, unspecified whether long term insulin use (H)  (R73.9) elevated blood sugar  (Z79.4) long-term use of insulin  Comment: Chronic.  Currently taking Semaglutide, insulin Lantus and aspart with sliding scale.   For secondary prevention she is taking aspirin and statin,  she is allergic to ACEI and ARBS.    Plan: follow up with Dr. Lackey Endocrinology Date & Time of Appointment: 12/19/23 @ 2:00 PM  Increase Glargine to 48 units  Increase meal time aspart to 10 units TID   Continue with sliding scale  Continue with blood sugar checks.       (G47.00) insomnia, unspecified type  Comment: Patient having difficulty remaining asleep  Plan: Start melatonin 3 mg daily    Electronically signed by:      ALONZO Romero CNP on 11/20/2023 at 12:54 PM    MEDICATIONS:  MED REC REQUIRED  Post Medication Reconciliation Status:  Medication reconciliation previously completed during another office visit                Sincerely,        ALONZO Romero CNP

## 2023-11-20 NOTE — PROGRESS NOTES
[facility]        Visit Type: Blood Sugar      Facility:   [facility] ([facility]) [[id number]]         History of Present Illness: Nahed Courtney is a 68 year old female     Her past medical history includes  Left gangrenous left heel s/p BKA (11/2023)  Type 2 diabetes  Peripheral artery disease   COPD   HFpEF    BS remains elevated despite increasing insulin and giving her 5 units for each meal.   Semaglutide on hold while in facility.  She only took this one time prior to admission.  Weight decreasing   VS stable.    Pt would like her insulin increased    Current Outpatient Medications   Medication Sig Dispense Refill    acetaminophen (TYLENOL) 500 MG tablet Take 1,000 mg by mouth every 6 hours as needed      albuterol (PROAIR HFA/PROVENTIL HFA/VENTOLIN HFA) 108 (90 Base) MCG/ACT inhaler Inhale 2 puffs into the lungs      arginine (ARGINAID) PACK Take 1 packet by mouth 3 times daily      aspirin (ASPIR-81) 81 MG EC tablet Take 81 mg by mouth daily.      atorvastatin (LIPITOR) 80 MG tablet Take 40 mg by mouth daily      blood glucose (ONETOUCH VERIO IQ) test strip Test 4 times per day ** Pharmacy allowed to substitute per Patients Insurance      carvedilol (COREG) 12.5 MG tablet Take 12.5 mg by mouth 2 times daily (with meals)      clopidogrel (PLAVIX) 75 MG tablet Take 75 mg by mouth daily      Fluticasone-Umeclidin-Vilant (TRELEGY ELLIPTA) 100-62.5-25 MCG/ACT oral inhaler Inhale 1 Inhalation into the lungs daily      furosemide (LASIX) 20 MG tablet Take 1 tablet by mouth daily      gabapentin (NEURONTIN) 600 MG tablet Take 600 mg by mouth 3 times daily.      hydrochlorothiazide (HYDRODIURIL) 25 MG tablet Take 1 tablet by mouth daily      hydrOXYzine (VISTARIL) 25 MG capsule Take 25 mg by mouth every 4 hours as needed for anxiety      Insulin Aspart FlexPen 100 UNIT/ML SOPN Inject Subcutaneous 3 times daily (with meals) f 0 - 119 = 0 units;   120 - 149 = 1 unit;   150 - 199 = 2 units;    "200 - 249 = 4 units;   250 - 299 = 6 Units;   300 - 349 = 8 units;   350 - 999 = 10 units    At HS   0 - 149 = 0 units;   150 - 199 = 1 unit;   200 - 249 = 2 units;   250 - 299 = 3 units;   300 - 349 = 4 units;   350 - 999 = 5 units      insulin glargine (LANTUS PEN) 100 UNIT/ML pen Inject 44 Units Subcutaneous at bedtime      loperamide (IMODIUM) 2 MG capsule Take 2 mg by mouth      Multiple Vitamins-Minerals (JUAN MULTI WOMEN) TBCR Take  by mouth daily.      nitroglycerin (NITROSTAT) 0.4 MG SL tablet Place  under the tongue every 5 minutes as needed.      nystatin (MYCOSTATIN) 488681 UNIT/GM external powder Apply to skin twice a day.      oxyCODONE (ROXICODONE) 5 MG tablet Take 2.5mg Q 6 hours PRN x 5 days then 5mg Q 12 hours PRN thereafter. 60 tablet 0    polyethylene glycol (MIRALAX) 17 g packet Take 17 g by mouth      potassium chloride ER (KLOR-CON M) 20 MEQ CR tablet Take 1 tablet by mouth daily      semaglutide (OZEMPIC) 2 MG/3ML pen 0.25 mg weekly on Sundays until seen by endocrinology   carrie for patient to use home dose of Ozempic. (Patient not taking: Reported on 11/16/2023)      senna-docusate (SENOKOT-S/PERICOLACE) 8.6-50 MG tablet Take 1 tablet by mouth 2 times daily as needed for constipation         ALLERGIES:Chicken-derived products (egg), Lisinopril, Losartan, Miconazole, Morphine and related, Vicodin [hydrocodone-acetaminophen], Egg white (egg protein), and Hydrocodone-acetaminophen    Vitals:  BP (!) 152/68   Pulse 79   Temp 97.2  F (36.2  C)   Resp 18   Ht 1.651 m (5' 5\")   Wt 136.3 kg (300 lb 6.4 oz)   SpO2 95%   BMI 49.99 kg/m    Body mass index is 49.99 kg/m .    Review of Systems   All other systems reviewed and are negative.       Physical Exam  Vitals and nursing note reviewed.   Constitutional:       Appearance: She is obese.   Skin:         Neurological:      Mental Status: She is alert.           Labs:     Most Recent 3 CBC's:  Recent Labs   Lab Test 11/15/23  0611   WBC 9.8 "   HGB 10.5*   MCV 91        Most Recent 3 BMP's:  Recent Labs   Lab Test 11/15/23  0611 06/17/21  0730 05/24/21  0655    140 141   POTASSIUM 3.5 3.4* 2.9*   CHLORIDE 94* 99 93*   CO2 32* 29 34*   BUN 15.5 16 22   CR 0.93 0.95 1.21*   ANIONGAP 12 12 14   ROBINA 8.9 8.5 8.2*   * 184* 126*       Assessment/Plan:  (Z89.512) Status post below-knee amputation of left lower extremity (H)  Comment: Occurring in 11/2023.  Received antibiotics after the surgery.  On 1/11/2023 developed fever, blood cultures negative.   Continue atorvastatin, Plavix and aspirin.     For pain she has oxycodone and hydroxyzine for anxiety and itching.  Incision is CDI with staples intact  Plan: follow up on 11/29 at [facility] [place] to see Dr. Faustin - writer expects staples out at that time.      (E11.52) Type 2 diabetes mellitus with diabetic peripheral angiopathy with gangrene, unspecified whether long term insulin use (H)  (R73.9) elevated blood sugar  (Z79.4) long-term use of insulin  Comment: Chronic.  Currently taking Semaglutide, insulin Lantus and aspart with sliding scale.   For secondary prevention she is taking aspirin and statin,  she is allergic to ACEI and ARBS.    Plan: follow up with Dr. Lackey Endocrinology Date & Time of Appointment: 12/19/23 @ 2:00 PM  Increase Glargine to 48 units  Increase meal time aspart to 10 units TID   Continue with sliding scale  Continue with blood sugar checks.       (G47.00) insomnia, unspecified type  Comment: Patient having difficulty remaining asleep  Plan: Start melatonin 3 mg daily    Electronically signed by:      ALONZO Romero CNP on 11/20/2023 at 12:54 PM    MEDICATIONS:  MED REC REQUIRED  Post Medication Reconciliation Status:  Medication reconciliation previously completed during another office visit

## 2023-11-28 ENCOUNTER — TRANSITIONAL CARE UNIT VISIT (OUTPATIENT)
Dept: GERIATRICS | Facility: CLINIC | Age: 68
End: 2023-11-28
Payer: COMMERCIAL

## 2023-11-28 VITALS
BODY MASS INDEX: 48.82 KG/M2 | DIASTOLIC BLOOD PRESSURE: 68 MMHG | TEMPERATURE: 97.9 F | RESPIRATION RATE: 18 BRPM | OXYGEN SATURATION: 93 % | WEIGHT: 293 LBS | SYSTOLIC BLOOD PRESSURE: 150 MMHG | HEIGHT: 65 IN | HEART RATE: 77 BPM

## 2023-11-28 DIAGNOSIS — Z89.512 STATUS POST BELOW-KNEE AMPUTATION OF LEFT LOWER EXTREMITY (H): ICD-10-CM

## 2023-11-28 DIAGNOSIS — R73.9 ELEVATED BLOOD SUGAR: ICD-10-CM

## 2023-11-28 DIAGNOSIS — E11.52 TYPE 2 DIABETES MELLITUS WITH DIABETIC PERIPHERAL ANGIOPATHY AND GANGRENE, WITH LONG-TERM CURRENT USE OF INSULIN (H): Primary | ICD-10-CM

## 2023-11-28 DIAGNOSIS — E66.01 MORBID OBESITY (H): ICD-10-CM

## 2023-11-28 DIAGNOSIS — E13.69 OSTEOMYELITIS DUE TO SECONDARY DIABETES (H): ICD-10-CM

## 2023-11-28 DIAGNOSIS — M86.9 OSTEOMYELITIS DUE TO SECONDARY DIABETES (H): ICD-10-CM

## 2023-11-28 DIAGNOSIS — Z79.4 LONG TERM (CURRENT) USE OF INSULIN (H): ICD-10-CM

## 2023-11-28 DIAGNOSIS — Z79.4 TYPE 2 DIABETES MELLITUS WITH DIABETIC PERIPHERAL ANGIOPATHY AND GANGRENE, WITH LONG-TERM CURRENT USE OF INSULIN (H): Primary | ICD-10-CM

## 2023-11-28 PROBLEM — J96.01 ACUTE RESPIRATORY FAILURE WITH HYPOXIA (H): Status: RESOLVED | Noted: 2020-08-17 | Resolved: 2023-11-28

## 2023-11-28 PROCEDURE — 99309 SBSQ NF CARE MODERATE MDM 30: CPT | Performed by: NURSE PRACTITIONER

## 2023-11-28 RX ORDER — INSULIN ASPART 100 [IU]/ML
14 INJECTION, SOLUTION INTRAVENOUS; SUBCUTANEOUS
Start: 2023-11-28 | End: 2023-12-04

## 2023-11-28 NOTE — PROGRESS NOTES
"[facility] GERIATRICS    Chief Complaint   Patient presents with    RECHECK     HPI:  Nahed Courtney is a 68 year old  (1955), who is being seen today for an episodic care visit at: [facility] ([facility]) [[id number]].     Her past medical history includes  Left gangrenous left heel s/p BKA (11/2023)  Type 2 diabetes  Peripheral artery disease   COPD   HFpEF        From 11/1/2023 to 11/5/2023, patient was hospitalized for left gangrenous heel wound.          On 11/8/2023, patient was readmitted for evaluation of left gangrenous foot wound and underwent a left BKA (11/9/2023).  She was placed on vancomycin and Zosyn and clindamycin.    On 11/16, her glarinine was increased to 44 units    On 11/20, her glargine was increased to 48 units (same a PTA), her meal time insulin aspart increased to 10 units TID and she was started on melatonin      Today her BS continue to be elevated at meal time despite 10 units at meals.   BP slightly elevated.         Allergies, and PMH/PSH reviewed in EPIC today.  REVIEW OF SYSTEMS:  4 point ROS including Respiratory, CV, GI and , other than that noted in the HPI,  is negative    Objective:   BP (!) 150/68   Pulse 77   Temp 97.9  F (36.6  C)   Resp 18   Ht 1.651 m (5' 5\")   Wt 138.4 kg (305 lb 3.2 oz)   SpO2 93%   BMI 50.79 kg/m    GENERAL APPEARANCE:  Alert, in no distress, morbidly obese  RESP:  no respiratory distress  PSYCH:  oriented X 3    Most Recent 3 CBC's:  Recent Labs   Lab Test 11/15/23  0611   WBC 9.8   HGB 10.5*   MCV 91        Most Recent 3 BMP's:  Recent Labs   Lab Test 11/15/23  0611 06/17/21  0730 05/24/21  0655    140 141   POTASSIUM 3.5 3.4* 2.9*   CHLORIDE 94* 99 93*   CO2 32* 29 34*   BUN 15.5 16 22   CR 0.93 0.95 1.21*   ANIONGAP 12 12 14   ROBINA 8.9 8.5 8.2*   * 184* 126*       Assessment/Plan:  (Z89.512) Status post below-knee amputation of left lower extremity (H)  (E13.69, M86.9) Osteomyelitis due to secondary " diabetes  Comment: Occurring in 11/2023.  Received antibiotics after the surgery.  On 1/11/2023 developed fever, blood cultures negative.   Continue atorvastatin, Plavix and aspirin.     For pain she has oxycodone and hydroxyzine for anxiety and itching.  Incision is CDI with staples intact  Plan: follow up on 11/29 at [facility] to see Dr. Faustin - writer expects staples out at that time.      (E11.52) Type 2 diabetes mellitus with diabetic peripheral angiopathy with gangrene, unspecified whether long term insulin use (H)  (R73.9) elevated blood sugar  (Z79.4) long-term use of insulin  Comment: Chronic.  Currently taking  insulin Lantus 48 units and aspart 10 units and meals and with sliding scale. Semaglutide on hold.   For secondary prevention she is taking aspirin and statin,  she is allergic to ACEI and ARBS.    Plan: follow up with Dr. Lackey Endocrinology Date & Time of Appointment: 12/19/23 @ 2:00 PM  Increase meal time aspart to 14 units TID   Continue with sliding scale  Continue with blood sugar checks    MED REC REQUIRED  Post Medication Reconciliation Status:  Discharge medications reconciled, continue medications without change          Electronically signed by:       ALONZO Romero CNP on 11/28/2023 at 8:46 AM

## 2023-11-28 NOTE — LETTER
"    11/28/2023        RE: Nahed Courtney  [address]  [facility] [address]        [facility]    Chief Complaint   Patient presents with     RECHECK     HPI:  Nahed Courtney is a 68 year old  (1955), who is being seen today for an episodic care visit at: [facility] ([facility]) [[id number]].     Her past medical history includes  Left gangrenous left heel s/p BKA (11/2023)  Type 2 diabetes  Peripheral artery disease   COPD   HFpEF        From 11/1/2023 to 11/5/2023, patient was hospitalized for left gangrenous heel wound.          On 11/8/2023, patient was readmitted for evaluation of left gangrenous foot wound and underwent a left BKA (11/9/2023).  She was placed on vancomycin and Zosyn and clindamycin.    On 11/16, her glarinine was increased to 44 units    On 11/20, her glargine was increased to 48 units (same a PTA), her meal time insulin aspart increased to 10 units TID and she was started on melatonin      Today her BS continue to be elevated at meal time despite 10 units at meals.   BP slightly elevated.         Allergies, and PMH/PSH reviewed in EPIC today.  REVIEW OF SYSTEMS:  4 point ROS including Respiratory, CV, GI and , other than that noted in the HPI,  is negative    Objective:   BP (!) 150/68   Pulse 77   Temp 97.9  F (36.6  C)   Resp 18   Ht 1.651 m (5' 5\")   Wt 138.4 kg (305 lb 3.2 oz)   SpO2 93%   BMI 50.79 kg/m    GENERAL APPEARANCE:  Alert, in no distress, morbidly obese  RESP:  no respiratory distress  PSYCH:  oriented X 3    Most Recent 3 CBC's:  Recent Labs   Lab Test 11/15/23  0611   WBC 9.8   HGB 10.5*   MCV 91        Most Recent 3 BMP's:  Recent Labs   Lab Test 11/15/23  0611 06/17/21  0730 05/24/21  0655    140 141   POTASSIUM 3.5 3.4* 2.9*   CHLORIDE 94* 99 93*   CO2 32* 29 34*   BUN 15.5 16 22   CR 0.93 0.95 1.21*   ANIONGAP 12 12 14   ROBINA 8.9 8.5 8.2*   * 184* 126*       Assessment/Plan:  (Z89.512) " Status post below-knee amputation of left lower extremity (H)  (E13.69, M86.9) Osteomyelitis due to secondary diabetes  Comment: Occurring in 11/2023.  Received antibiotics after the surgery.  On 1/11/2023 developed fever, blood cultures negative.   Continue atorvastatin, Plavix and aspirin.     For pain she has oxycodone and hydroxyzine for anxiety and itching.  Incision is CDI with staples intact  Plan: follow up on 11/29 at [facility] to see Dr. Faustin - writer expects staples out at that time.      (E11.52) Type 2 diabetes mellitus with diabetic peripheral angiopathy with gangrene, unspecified whether long term insulin use (H)  (R73.9) elevated blood sugar  (Z79.4) long-term use of insulin  Comment: Chronic.  Currently taking  insulin Lantus 48 units and aspart 10 units and meals and with sliding scale. Semaglutide on hold.   For secondary prevention she is taking aspirin and statin,  she is allergic to ACEI and ARBS.    Plan: follow up with Dr. Lackey Endocrinology Date & Time of Appointment: 12/19/23 @ 2:00 PM  Increase meal time aspart to 14 units TID   Continue with sliding scale  Continue with blood sugar checks    MED REC REQUIRED  Post Medication Reconciliation Status:  Discharge medications reconciled, continue medications without change          Electronically signed by:       ALONZO Romero CNP on 11/28/2023 at 8:46 AM           Sincerely,        ALONZO Romero CNP

## 2023-12-04 ENCOUNTER — TRANSITIONAL CARE UNIT VISIT (OUTPATIENT)
Dept: GERIATRICS | Facility: CLINIC | Age: 68
End: 2023-12-04
Payer: COMMERCIAL

## 2023-12-04 VITALS
RESPIRATION RATE: 18 BRPM | OXYGEN SATURATION: 92 % | BODY MASS INDEX: 48.82 KG/M2 | SYSTOLIC BLOOD PRESSURE: 162 MMHG | HEIGHT: 65 IN | DIASTOLIC BLOOD PRESSURE: 79 MMHG | WEIGHT: 293 LBS | TEMPERATURE: 97.3 F | HEART RATE: 87 BPM

## 2023-12-04 DIAGNOSIS — Z79.4 TYPE 2 DIABETES MELLITUS WITH DIABETIC PERIPHERAL ANGIOPATHY AND GANGRENE, WITH LONG-TERM CURRENT USE OF INSULIN (H): ICD-10-CM

## 2023-12-04 DIAGNOSIS — E11.52 TYPE 2 DIABETES MELLITUS WITH DIABETIC PERIPHERAL ANGIOPATHY AND GANGRENE, WITH LONG-TERM CURRENT USE OF INSULIN (H): ICD-10-CM

## 2023-12-04 DIAGNOSIS — Z79.4 LONG TERM (CURRENT) USE OF INSULIN (H): ICD-10-CM

## 2023-12-04 DIAGNOSIS — R73.9 ELEVATED BLOOD SUGAR: ICD-10-CM

## 2023-12-04 PROCEDURE — 99309 SBSQ NF CARE MODERATE MDM 30: CPT | Performed by: NURSE PRACTITIONER

## 2023-12-04 RX ORDER — INSULIN ASPART 100 [IU]/ML
INJECTION, SOLUTION INTRAVENOUS; SUBCUTANEOUS
COMMUNITY

## 2023-12-04 RX ORDER — INSULIN ASPART 100 [IU]/ML
16 INJECTION, SOLUTION INTRAVENOUS; SUBCUTANEOUS
Start: 2023-12-04 | End: 2023-12-14

## 2023-12-04 NOTE — PROGRESS NOTES
Nahed Courtney   1955    Orders from today     Stop current Aspart insulin at meals  Start Aspart insulin 16 units subcutaneously at meals three times daily.   Hold if pt not eating or NPO.  Dx Type 2 DM  Start furosemide 20 mg daily at noon.  Dx HTN  BMP next lab day.  Dx DM      Yadira Juarez, ALONZO CNP on 12/4/2023 at 5:13 PM

## 2023-12-04 NOTE — LETTER
"    12/4/2023        RE: Nahed Courtney  [address]  [facility] [address]        [facility]    Chief Complaint   Patient presents with     Blood Sugar Problem     Wound Check     HPI:  Nahed Courtney is a 68 year old  (1955), who is being seen today for an episodic care visit at: [facility] ([facility]) [[id number]].     Her past medical history includes  Left gangrenous left heel s/p BKA (11/2023)  Type 2 diabetes  Peripheral artery disease   COPD   HFpEF        From 11/1/2023 to 11/5/2023, patient was hospitalized for left gangrenous heel wound.        On 11/16/2023, patient's glargine was increased to 44 units/day.    On 11/20/2023, patient's glargine was increased to 48 units daily, her mealtime insulin was increased to 10 units 3 times daily.  She was started on melatonin    on 11/28/2023 her mealtime insulin was increased to 14 units 3 times daily.    On 11/29/2023, patient saw Dr. Hamilton.  Her ruma should remain in.  Continue with daily dressing changes and wearing her IPOP at all times.  She remains nonweightbearing on her left lower extremity.  Follow-up in 2 weeks    Today, patient's blood pressure is elevated  She remains on oxygen  Pt has peeling palms      Allergies, and PMH/PSH reviewed in [facility] today.  REVIEW OF SYSTEMS:  4 point ROS including Respiratory, CV, GI and , other than that noted in the HPI,  is negative    Objective:   BP (!) 162/79   Pulse 87   Temp 97.3  F (36.3  C)   Resp 18   Ht 1.651 m (5' 5\")   Wt 141.3 kg (311 lb 8 oz)   SpO2 92%   BMI 51.84 kg/m    GENERAL APPEARANCE:  Alert, in no distress  RESP:  lungs clear to auscultation , no respiratory distress  CV:  Palpation and auscultation of heart done , rate-normal  SKIN:  peeling bilateral palms  PSYCH:  oriented X 3    Most Recent 3 CBC's:  Recent Labs   Lab Test 11/15/23  0611   WBC 9.8   HGB 10.5*   MCV 91        Most Recent 3 BMP's:  Recent Labs   Lab Test " 11/15/23  0611 06/17/21  0730 05/24/21  0655    140 141   POTASSIUM 3.5 3.4* 2.9*   CHLORIDE 94* 99 93*   CO2 32* 29 34*   BUN 15.5 16 22   CR 0.93 0.95 1.21*   ANIONGAP 12 12 14   ROBINA 8.9 8.5 8.2*   * 184* 126*     Most Recent Hemoglobin A1c:  Recent Labs   Lab Test 11/15/23  0611   A1C 9.2*       Assessment/Plan:  (I96) Gangrene of left foot (H)  (primary encounter diagnosis)  (Z89.512) Status post below-knee amputation of left lower extremity (H)  Comment: Occurring in 11/2023.  Received antibiotics after the surgery.  On 1/11/2023 developed fever, blood cultures negative.   Continue atorvastatin, Plavix and aspirin.     Her oxycodone and hydroxyzine were discontinued.  She currently has Tylenol as needed for pain.    Plan:   Non weight bearing  Ok to shower. Keep splint and/or incision site clean, dry, intact. Recommend using press'n seal cling wrap to wrap around splint or a shower guard to cover your incision in order to keep dry. Do not immerse operative extremity in water.   Elevate left leg as needed.   Change dressing to left BKA every other day and PRN. Use adaptic and dry gauze   Clamshell protector to left lower extremity. Remove protector and roll down stocking 3 times daily and as needed to check skin integrity.   Follow up appointment will be with [facility] on December 11   therapy to evaluate and treat        (E11.52) Type 2 diabetes mellitus with diabetic peripheral angiopathy with gangrene, unspecified whether long term insulin use (H)  Comment: Chronic.  Currently taking Semaglutide, insulin Lantus and aspart with sliding scale.   For secondary prevention she is taking aspirin and statin,  she is allergic to ACEI and ARBS.    Plan: follow up with endocrinology on 12/19/2023  Increase meal time insulin to 16 meals aspart          (I73.9) PAD (peripheral artery disease) (H24)  Comment: chronic.   Currently taking Plavix and aspirin  Plan: Continue with plan of care no  changes at this time, adjustment as needed        (J44.9) Chronic obstructive pulmonary disease, unspecified COPD type (H)  Comment: chronic.  Currently taking inhalers  Plan: Continue with plan of care no changes at this time, adjustment as needed        (I50.30) Heart failure with preserved ejection fraction, NYHA class II (H)  Comment: Chronic.   Currently taking furosemide  Plan:   Increase furosemide       MED REC REQUIRED  Post Medication Reconciliation Status:  Discharge medications reconciled, continue medications without change      Electronically signed by:       ALONZO Romero CNP on 12/4/2023 at 9:08 AM         Nahed Courtney   1955    Orders from today     Stop current Aspart insulin at meals  Start Aspart insulin 16 units subcutaneously at meals three times daily.   Hold if pt not eating or NPO.  Dx Type 2 DM  Start furosemide 20 mg daily at noon.  Dx HTN  BMP next lab day.  Dx DM      ALONZO Romero CNP on 12/4/2023 at 5:13 PM      Sincerely,        ALONZO Romero CNP

## 2023-12-04 NOTE — PROGRESS NOTES
"[facility] GERIATRICS    Chief Complaint   Patient presents with    Blood Sugar Problem    Wound Check     HPI:  Nahed Courtney is a 68 year old  (1955), who is being seen today for an episodic care visit at: [facility] ([facility]) [[id number]].     Her past medical history includes  Left gangrenous left heel s/p BKA (11/2023)  Type 2 diabetes  Peripheral artery disease   COPD   HFpEF        From 11/1/2023 to 11/5/2023, patient was hospitalized for left gangrenous heel wound.        On 11/16/2023, patient's glargine was increased to 44 units/day.    On 11/20/2023, patient's glargine was increased to 48 units daily, her mealtime insulin was increased to 10 units 3 times daily.  She was started on melatonin    on 11/28/2023 her mealtime insulin was increased to 14 units 3 times daily.    On 11/29/2023, patient saw Dr. Hamilton.  Her ruma should remain in.  Continue with daily dressing changes and wearing her IPOP at all times.  She remains nonweightbearing on her left lower extremity.  Follow-up in 2 weeks    Today, patient's blood pressure is elevated  She remains on oxygen  Pt has peeling palms      Allergies, and PMH/PSH reviewed in [facility] today.  REVIEW OF SYSTEMS:  4 point ROS including Respiratory, CV, GI and , other than that noted in the HPI,  is negative    Objective:   BP (!) 162/79   Pulse 87   Temp 97.3  F (36.3  C)   Resp 18   Ht 1.651 m (5' 5\")   Wt 141.3 kg (311 lb 8 oz)   SpO2 92%   BMI 51.84 kg/m    GENERAL APPEARANCE:  Alert, in no distress  RESP:  lungs clear to auscultation , no respiratory distress  CV:  Palpation and auscultation of heart done , rate-normal  SKIN:  peeling bilateral palms  PSYCH:  oriented X 3    Most Recent 3 CBC's:  Recent Labs   Lab Test 11/15/23  0611   WBC 9.8   HGB 10.5*   MCV 91        Most Recent 3 BMP's:  Recent Labs   Lab Test 11/15/23  0611 06/17/21  0730 05/24/21  0655    140 141   POTASSIUM 3.5 3.4* 2.9*   CHLORIDE 94* 99 " 93*   CO2 32* 29 34*   BUN 15.5 16 22   CR 0.93 0.95 1.21*   ANIONGAP 12 12 14   ROBINA 8.9 8.5 8.2*   * 184* 126*     Most Recent Hemoglobin A1c:  Recent Labs   Lab Test 11/15/23  0611   A1C 9.2*       Assessment/Plan:  (I96) Gangrene of left foot (H)  (primary encounter diagnosis)  (Z89.512) Status post below-knee amputation of left lower extremity (H)  Comment: Occurring in 11/2023.  Received antibiotics after the surgery.  On 1/11/2023 developed fever, blood cultures negative.   Continue atorvastatin, Plavix and aspirin.     Her oxycodone and hydroxyzine were discontinued.  She currently has Tylenol as needed for pain.    Plan:   Non weight bearing  Ok to shower. Keep splint and/or incision site clean, dry, intact. Recommend using press'n seal cling wrap to wrap around splint or a shower guard to cover your incision in order to keep dry. Do not immerse operative extremity in water.   Elevate left leg as needed.   Change dressing to left BKA every other day and PRN. Use adaptic and dry gauze   Clamshell protector to left lower extremity. Remove protector and roll down stocking 3 times daily and as needed to check skin integrity.   Follow up appointment will be with [facility] on December 11   therapy to evaluate and treat        (E11.52) Type 2 diabetes mellitus with diabetic peripheral angiopathy with gangrene, unspecified whether long term insulin use (H)  Comment: Chronic.  Currently taking Semaglutide, insulin Lantus and aspart with sliding scale.   For secondary prevention she is taking aspirin and statin,  she is allergic to ACEI and ARBS.    Plan: follow up with endocrinology on 12/19/2023  Increase meal time insulin to 16 meals aspart          (I73.9) PAD (peripheral artery disease) (H24)  Comment: chronic.   Currently taking Plavix and aspirin  Plan: Continue with plan of care no changes at this time, adjustment as needed        (J44.9) Chronic obstructive pulmonary disease, unspecified  COPD type (H)  Comment: chronic.  Currently taking inhalers  Plan: Continue with plan of care no changes at this time, adjustment as needed        (I50.30) Heart failure with preserved ejection fraction, NYHA class II (H)  Comment: Chronic.   Currently taking furosemide  Plan:   Increase furosemide       MED REC REQUIRED  Post Medication Reconciliation Status:  Discharge medications reconciled, continue medications without change      Electronically signed by:       ALONZO Romero CNP on 12/4/2023 at 9:08 AM

## 2023-12-05 ENCOUNTER — LAB REQUISITION (OUTPATIENT)
Dept: LAB | Facility: CLINIC | Age: 68
End: 2023-12-05
Payer: COMMERCIAL

## 2023-12-05 DIAGNOSIS — I50.9 HEART FAILURE, UNSPECIFIED (H): ICD-10-CM

## 2023-12-06 LAB
ANION GAP SERPL CALCULATED.3IONS-SCNC: 13 MMOL/L (ref 7–15)
BUN SERPL-MCNC: 18 MG/DL (ref 8–23)
CALCIUM SERPL-MCNC: 9.2 MG/DL (ref 8.8–10.2)
CHLORIDE SERPL-SCNC: 97 MMOL/L (ref 98–107)
CREAT SERPL-MCNC: 0.87 MG/DL (ref 0.51–0.95)
DEPRECATED HCO3 PLAS-SCNC: 30 MMOL/L (ref 22–29)
EGFRCR SERPLBLD CKD-EPI 2021: 73 ML/MIN/1.73M2
GLUCOSE SERPL-MCNC: 155 MG/DL (ref 70–99)
POTASSIUM SERPL-SCNC: 3.5 MMOL/L (ref 3.4–5.3)
SODIUM SERPL-SCNC: 140 MMOL/L (ref 135–145)

## 2023-12-06 PROCEDURE — P9604 ONE-WAY ALLOW PRORATED TRIP: HCPCS | Mod: ORL | Performed by: NURSE PRACTITIONER

## 2023-12-06 PROCEDURE — 80048 BASIC METABOLIC PNL TOTAL CA: CPT | Mod: ORL | Performed by: NURSE PRACTITIONER

## 2023-12-06 PROCEDURE — 36415 COLL VENOUS BLD VENIPUNCTURE: CPT | Mod: ORL | Performed by: NURSE PRACTITIONER

## 2023-12-08 ENCOUNTER — DISCHARGE SUMMARY NURSING HOME (OUTPATIENT)
Dept: GERIATRICS | Facility: CLINIC | Age: 68
End: 2023-12-08
Payer: COMMERCIAL

## 2023-12-08 VITALS
BODY MASS INDEX: 48.82 KG/M2 | HEART RATE: 85 BPM | RESPIRATION RATE: 18 BRPM | DIASTOLIC BLOOD PRESSURE: 77 MMHG | WEIGHT: 293 LBS | SYSTOLIC BLOOD PRESSURE: 135 MMHG | HEIGHT: 65 IN | OXYGEN SATURATION: 97 % | TEMPERATURE: 97.2 F

## 2023-12-08 DIAGNOSIS — R58 BLOOD LOSS: ICD-10-CM

## 2023-12-08 DIAGNOSIS — K62.5 GASTROINTESTINAL HEMORRHAGE ASSOCIATED WITH ANORECTAL SOURCE: Primary | ICD-10-CM

## 2023-12-08 DIAGNOSIS — D64.9 ANEMIA, UNSPECIFIED TYPE: ICD-10-CM

## 2023-12-08 PROCEDURE — 99310 SBSQ NF CARE HIGH MDM 45: CPT | Performed by: NURSE PRACTITIONER

## 2023-12-08 NOTE — LETTER
12/8/2023        RE: Nahed Courtney  [address]  [facility] [place] [facility] [address]         [facility] GERIATR[facility]        Visit Type: Hospital transfer     Facility:   [facility] ([facility]) [[id number]]         History of Present Illness: Nahed Courtney is a 68 year old female     Her past medical history includes  Left gangrenous left heel s/p BKA (11/2023)  Type 2 diabetes  Peripheral artery disease   COPD   HFpEF  Obesity     On 11/16/2023, patient's glargine was increased to 44 units/day.     On 11/20/2023, patient's glargine was increased to 48 units daily, her mealtime insulin was increased to 10 units 3 times daily.  She was started on melatonin     on 11/28/2023 her mealtime insulin was increased to 14 units 3 times daily.     On 11/29/2023, patient saw Dr. Hamilton.  Her ruma should remain in.  Continue with daily dressing changes and wearing her IPOP at all times.  She remains nonweightbearing on her left lower extremity.  Follow-up in 2 weeks      Today Nahed complained of left lower quadrant abdomina pain.    She had a large bloody BM and at the time was hemodynamically stable.     Current Outpatient Medications   Medication Sig Dispense Refill     acetaminophen (TYLENOL) 500 MG tablet Take 1,000 mg by mouth 3 times daily       albuterol (PROAIR HFA/PROVENTIL HFA/VENTOLIN HFA) 108 (90 Base) MCG/ACT inhaler Inhale 2 puffs into the lungs       arginine (ARGINAID) PACK Take 1 packet by mouth 3 times daily       aspirin (ASPIR-81) 81 MG EC tablet Take 81 mg by mouth daily.       atorvastatin (LIPITOR) 80 MG tablet Take 40 mg by mouth daily       carvedilol (COREG) 12.5 MG tablet Take 12.5 mg by mouth 2 times daily (with meals)       clopidogrel (PLAVIX) 75 MG tablet Take 75 mg by mouth daily       Fluticasone-Umeclidin-Vilant (TRELEGY ELLIPTA) 100-62.5-25 MCG/ACT oral inhaler Inhale 1 Inhalation into the lungs daily       furosemide (LASIX) 20 MG tablet Take 20 mg by mouth 2  times daily       gabapentin (NEURONTIN) 600 MG tablet Take 600 mg by mouth 3 times daily.       hydrochlorothiazide (HYDRODIURIL) 25 MG tablet Take 1 tablet by mouth daily       insulin aspart (NOVOLOG FLEXPEN) 100 UNIT/ML pen Inject Subcutaneous 3 times daily (with meals)  if 0 - 119 = 0 units;   120 - 149 = 1 unit;   150 - 199 = 2 units;   200 - 249 = 4 units;   250 - 299 = 6 Units;   300 - 349 = 8 units;   350 - 999 = 10 units,    At at bedtime   if 0 - 149 = 0 units;   150 - 199 = 1 unit;   200 - 249 = 2 units;   250 - 299 = 3 units;   300 - 349 = 4 units;   350 - 999 = 5 units       Insulin Aspart FlexPen 100 UNIT/ML SOPN Inject 16 Units Subcutaneous 3 times daily (with meals)       insulin glargine (LANTUS PEN) 100 UNIT/ML pen Inject 48 Units Subcutaneous at bedtime       loperamide (IMODIUM) 2 MG capsule Take 2 mg by mouth       melatonin 3 MG tablet Take 1 tablet (3 mg) by mouth at bedtime       Multiple Vitamins-Minerals (JUAN MULTI WOMEN) TBCR Take  by mouth daily.       nitroglycerin (NITROSTAT) 0.4 MG SL tablet Place  under the tongue every 5 minutes as needed.       nystatin (MYCOSTATIN) 924612 UNIT/GM external powder Apply to skin twice a day.       polyethylene glycol (MIRALAX) 17 g packet Take 17 g by mouth       potassium chloride ER (KLOR-CON M) 20 MEQ CR tablet Take 1 tablet by mouth daily       senna-docusate (SENOKOT-S/PERICOLACE) 8.6-50 MG tablet Take 1 tablet by mouth 2 times daily as needed for constipation       blood glucose (ONETOUCH VERIO IQ) test strip Test 4 times per day ** Pharmacy allowed to substitute per Patients Insurance       semaglutide (OZEMPIC) 2 MG/3ML pen 0.25 mg weekly on Sundays until seen by endocrinology   [place] for patient to use home dose of Ozempic. (Patient not taking: Reported on 11/16/2023)         ALLERGIES:Chicken-derived products (egg), Lisinopril, Losartan, Miconazole, Morphine and related, Vicodin [hydrocodone-acetaminophen], Egg white (egg protein), and  "Hydrocodone-acetaminophen    Vitals:  /77   Pulse 85   Temp 97.2  F (36.2  C)   Resp 18   Ht 1.651 m (5' 5\")   Wt 142.2 kg (313 lb 9.6 oz)   SpO2 97%   BMI 52.19 kg/m    Body mass index is 52.19 kg/m .    Review of Systems   All other systems reviewed and are negative.       Physical Exam  Vitals and nursing note reviewed.   Constitutional:       Appearance: She is obese.   Genitourinary:     Comments: Large blood BM  Neurological:      Mental Status: She is alert.           Labs:     Most Recent 3 CBC's:  Recent Labs   Lab Test 11/15/23  0611   WBC 9.8   HGB 10.5*   MCV 91        Most Recent 3 BMP's:  Recent Labs   Lab Test 12/06/23  0645 11/15/23  0611 06/17/21  0730    138 140   POTASSIUM 3.5 3.5 3.4*   CHLORIDE 97* 94* 99   CO2 30* 32* 29   BUN 18.0 15.5 16   CR 0.87 0.93 0.95   ANIONGAP 13 12 12   ROBINA 9.2 8.9 8.5   * 186* 184*   Most Recent Hemoglobin A1c:  Recent Labs   Lab Test 11/15/23  0611   A1C 9.2*       Assessment/Plan:     Gastrointestinal hemorrhage associated with anorectal source  Blood loss  Anemia, unspecified type  Pt was complaining of left lower quadrant abdominal pain and had large bloody BM.  Hemodynamically stable but will need to be transferred to hospital via 911  Updated son  Pt understanding of need to transfer        Electronically signed by:ALONZO Romero CNP    MEDICATIONS:  MED REC REQUIRED  Post Medication Reconciliation Status:  Discharge medications reconciled, continue medications without change              Sincerely,        ALONZO Romero CNP      "

## 2023-12-08 NOTE — PROGRESS NOTES
[facility]        Visit Type: Hospital transfer     Facility:   [facility] ([facility]) [[id number]]         History of Present Illness: Nahed Courtney is a 68 year old female     Her past medical history includes  Left gangrenous left heel s/p BKA (11/2023)  Type 2 diabetes  Peripheral artery disease   COPD   HFpEF  Obesity     On 11/16/2023, patient's glargine was increased to 44 units/day.     On 11/20/2023, patient's glargine was increased to 48 units daily, her mealtime insulin was increased to 10 units 3 times daily.  She was started on melatonin     on 11/28/2023 her mealtime insulin was increased to 14 units 3 times daily.     On 11/29/2023, patient saw Dr. Hamilton.  Her ruma should remain in.  Continue with daily dressing changes and wearing her IPOP at all times.  She remains nonweightbearing on her left lower extremity.  Follow-up in 2 weeks      Today Nahed complained of left lower quadrant abdomina pain.    She had a large bloody BM and at the time was hemodynamically stable.     Current Outpatient Medications   Medication Sig Dispense Refill    acetaminophen (TYLENOL) 500 MG tablet Take 1,000 mg by mouth 3 times daily      albuterol (PROAIR HFA/PROVENTIL HFA/VENTOLIN HFA) 108 (90 Base) MCG/ACT inhaler Inhale 2 puffs into the lungs      arginine (ARGINAID) PACK Take 1 packet by mouth 3 times daily      aspirin (ASPIR-81) 81 MG EC tablet Take 81 mg by mouth daily.      atorvastatin (LIPITOR) 80 MG tablet Take 40 mg by mouth daily      carvedilol (COREG) 12.5 MG tablet Take 12.5 mg by mouth 2 times daily (with meals)      clopidogrel (PLAVIX) 75 MG tablet Take 75 mg by mouth daily      Fluticasone-Umeclidin-Vilant (TRELEGY ELLIPTA) 100-62.5-25 MCG/ACT oral inhaler Inhale 1 Inhalation into the lungs daily      furosemide (LASIX) 20 MG tablet Take 20 mg by mouth 2 times daily      gabapentin (NEURONTIN) 600 MG tablet Take 600 mg by mouth 3 times daily.      hydrochlorothiazide  "(HYDRODIURIL) 25 MG tablet Take 1 tablet by mouth daily      insulin aspart (NOVOLOG FLEXPEN) 100 UNIT/ML pen Inject Subcutaneous 3 times daily (with meals)  if 0 - 119 = 0 units;   120 - 149 = 1 unit;   150 - 199 = 2 units;   200 - 249 = 4 units;   250 - 299 = 6 Units;   300 - 349 = 8 units;   350 - 999 = 10 units,    At at bedtime   if 0 - 149 = 0 units;   150 - 199 = 1 unit;   200 - 249 = 2 units;   250 - 299 = 3 units;   300 - 349 = 4 units;   350 - 999 = 5 units      Insulin Aspart FlexPen 100 UNIT/ML SOPN Inject 16 Units Subcutaneous 3 times daily (with meals)      insulin glargine (LANTUS PEN) 100 UNIT/ML pen Inject 48 Units Subcutaneous at bedtime      loperamide (IMODIUM) 2 MG capsule Take 2 mg by mouth      melatonin 3 MG tablet Take 1 tablet (3 mg) by mouth at bedtime      Multiple Vitamins-Minerals (JUAN MULTI WOMEN) TBCR Take  by mouth daily.      nitroglycerin (NITROSTAT) 0.4 MG SL tablet Place  under the tongue every 5 minutes as needed.      nystatin (MYCOSTATIN) 790139 UNIT/GM external powder Apply to skin twice a day.      polyethylene glycol (MIRALAX) 17 g packet Take 17 g by mouth      potassium chloride ER (KLOR-CON M) 20 MEQ CR tablet Take 1 tablet by mouth daily      senna-docusate (SENOKOT-S/PERICOLACE) 8.6-50 MG tablet Take 1 tablet by mouth 2 times daily as needed for constipation      blood glucose (ONETOUCH VERIO IQ) test strip Test 4 times per day ** Pharmacy allowed to substitute per Patients Insurance      semaglutide (OZEMPIC) 2 MG/3ML pen 0.25 mg weekly on Sundays until seen by endocrinology   [place] for patient to use home dose of Ozempic. (Patient not taking: Reported on 11/16/2023)         ALLERGIES:Chicken-derived products (egg), Lisinopril, Losartan, Miconazole, Morphine and related, Vicodin [hydrocodone-acetaminophen], Egg white (egg protein), and Hydrocodone-acetaminophen    Vitals:  /77   Pulse 85   Temp 97.2  F (36.2  C)   Resp 18   Ht 1.651 m (5' 5\")   Wt 142.2 kg " (313 lb 9.6 oz)   SpO2 97%   BMI 52.19 kg/m    Body mass index is 52.19 kg/m .    Review of Systems   All other systems reviewed and are negative.       Physical Exam  Vitals and nursing note reviewed.   Constitutional:       Appearance: She is obese.   Genitourinary:     Comments: Large blood BM  Neurological:      Mental Status: She is alert.           Labs:     Most Recent 3 CBC's:  Recent Labs   Lab Test 11/15/23  0611   WBC 9.8   HGB 10.5*   MCV 91        Most Recent 3 BMP's:  Recent Labs   Lab Test 12/06/23  0645 11/15/23  0611 06/17/21  0730    138 140   POTASSIUM 3.5 3.5 3.4*   CHLORIDE 97* 94* 99   CO2 30* 32* 29   BUN 18.0 15.5 16   CR 0.87 0.93 0.95   ANIONGAP 13 12 12   ROBINA 9.2 8.9 8.5   * 186* 184*   Most Recent Hemoglobin A1c:  Recent Labs   Lab Test 11/15/23  0611   A1C 9.2*       Assessment/Plan:     Gastrointestinal hemorrhage associated with anorectal source  Blood loss  Anemia, unspecified type  Pt was complaining of left lower quadrant abdominal pain and had large bloody BM.  Hemodynamically stable but will need to be transferred to hospital via 911  Updated son  Pt understanding of need to transfer        Electronically signed by:ALONZO Romero CNP    MEDICATIONS:  MED REC REQUIRED  Post Medication Reconciliation Status:  Discharge medications reconciled, continue medications without change

## 2023-12-12 VITALS
WEIGHT: 293 LBS | SYSTOLIC BLOOD PRESSURE: 150 MMHG | HEART RATE: 87 BPM | BODY MASS INDEX: 48.82 KG/M2 | HEIGHT: 65 IN | RESPIRATION RATE: 18 BRPM | DIASTOLIC BLOOD PRESSURE: 70 MMHG | TEMPERATURE: 97.3 F | OXYGEN SATURATION: 95 %

## 2023-12-13 ENCOUNTER — TRANSITIONAL CARE UNIT VISIT (OUTPATIENT)
Dept: GERIATRICS | Facility: CLINIC | Age: 68
End: 2023-12-13
Payer: COMMERCIAL

## 2023-12-13 DIAGNOSIS — I50.30 HEART FAILURE WITH PRESERVED EJECTION FRACTION, NYHA CLASS II (H): ICD-10-CM

## 2023-12-13 DIAGNOSIS — J44.9 CHRONIC OBSTRUCTIVE PULMONARY DISEASE, UNSPECIFIED COPD TYPE (H): ICD-10-CM

## 2023-12-13 DIAGNOSIS — Z92.89: ICD-10-CM

## 2023-12-13 DIAGNOSIS — D64.9 ANEMIA, UNSPECIFIED TYPE: ICD-10-CM

## 2023-12-13 DIAGNOSIS — Z86.0100 HISTORY OF COLONOSCOPY WITH POLYPECTOMY: ICD-10-CM

## 2023-12-13 DIAGNOSIS — Z89.512 STATUS POST BELOW-KNEE AMPUTATION OF LEFT LOWER EXTREMITY (H): ICD-10-CM

## 2023-12-13 DIAGNOSIS — I73.9 PAD (PERIPHERAL ARTERY DISEASE) (H): ICD-10-CM

## 2023-12-13 DIAGNOSIS — K57.91 GASTROINTESTINAL HEMORRHAGE ASSOCIATED WITH INTESTINAL DIVERTICULOSIS: Primary | ICD-10-CM

## 2023-12-13 DIAGNOSIS — I25.10 CORONARY ARTERY DISEASE INVOLVING NATIVE CORONARY ARTERY OF NATIVE HEART WITHOUT ANGINA PECTORIS: ICD-10-CM

## 2023-12-13 DIAGNOSIS — E66.01 MORBID OBESITY (H): ICD-10-CM

## 2023-12-13 DIAGNOSIS — Z98.890 HISTORY OF COLONOSCOPY WITH POLYPECTOMY: ICD-10-CM

## 2023-12-13 DIAGNOSIS — E11.52 TYPE 2 DIABETES MELLITUS WITH DIABETIC PERIPHERAL ANGIOPATHY WITH GANGRENE, UNSPECIFIED WHETHER LONG TERM INSULIN USE (H): ICD-10-CM

## 2023-12-13 DIAGNOSIS — I96 GANGRENE OF LEFT FOOT (H): ICD-10-CM

## 2023-12-13 PROCEDURE — 99310 SBSQ NF CARE HIGH MDM 45: CPT | Performed by: NURSE PRACTITIONER

## 2023-12-13 RX ORDER — DIPHENHYDRAMINE HYDROCHLORIDE 12.5 MG/1
12.5 BAR, CHEWABLE ORAL EVERY 6 HOURS PRN
COMMUNITY
End: 2023-12-19

## 2023-12-13 NOTE — PROGRESS NOTES
[facility] GERIATRICS    PRIMARY CARE PROVIDER AND CLINIC:  Lindsay Metz, None  Chief Complaint   Patient presents with    Hospital F/U      [facility] Medical Record Number:  [id number]  Place of Service where encounter took place:  [facility] (TCU) [[id number]]    Nahed Courtney  is a 68 year old  (1955), returned to the above facility from  [facility]. Hospital stay 12/08/23 - 12/12/23. .       Her past medical history includes  Left gangrenous left heel s/p BKA (11/2023)  Type 2 diabetes  Peripheral artery disease.  LLE popliteal artery stent 11/2/23 - Had LLE angiogram, balloon angioplasty, stenting of popliteal artery 11/5 for critical limb threatening ischemia w/ foot wound. D/w IR 12/10. Recommendation for 3 months DAPT. Resumed as per discussion w GI 12/10 with no ongoing bleeding.    COPD oxygen dependent 2 L   HFpEF  GI bleed, likely diverticular   CAD.  S/p PCI to RCA 2009   Morbid obesity          From 11/1/2023 to 11/5/2023, patient was hospitalized for left gangrenous heel wound.          On 11/16/2023, patient's glargine was increased to 44 units/day.     On 11/20/2023, patient's glargine was increased to 48 units daily, her mealtime insulin was increased to 10 units 3 times daily.  She was started on melatonin     on 11/28/2023 her mealtime insulin was increased to 14 units 3 times daily.     On 11/29/2023, patient saw Dr. Hamilton.  Her ruma should remain in.  Continue with daily dressing changes and wearing her IPOP at all times.  She remains nonweightbearing on her left lower extremity.  Follow-up in 2 weeks    On 12/8/2023, she was hospitalized for GI bleed.  Her CT angio this afternoon which showed area of bleeding in the descending/sigmoid colon junction with diverticulosis and without evidence of diverticulitis.  Atherosclerotic disease of the abdominal aorta.   Hgb initially was 10.    GI recommended admission for colonoscopy. Received  "2u pRBC in ED, due to ongoing BRBPR. Initially admitted to SICU.  Bleeding since stopped and colonoscopy 12/9/23 showed no evidence of brisk ongoing bleeding. Small polyps were removed from sigmoid and descending with pathology pending.  Suspected spontaneous resolution of diverticular bleeding. Plavix and ASA restarted     Today she was sitting in bed comfortably.  VS stable.       CODE STATUS/ADVANCE DIRECTIVES DISCUSSION:  No Order  CPR/Full code   ALLERGIES:   Allergies   Allergen Reactions    Chicken-Derived Products (Egg) Nausea     Other Reaction(s): Gastrointestinal    Lisinopril Cough and Nausea and Vomiting     PN: Other reaction(s): Cough, Vomiting    Losartan Cough    Miconazole Dermatitis     PN: Other reaction(s): Dermatitis    Morphine And Related Other (See Comments)     \"felt like I was dying when I took it\"    Vicodin [Hydrocodone-Acetaminophen]     Egg White (Egg Protein)      Other Reaction(s): Flu Like Symptoms    Hydrocodone-Acetaminophen Anxiety      PAST MEDICAL HISTORY: No past medical history on file.   PAST SURGICAL HISTORY:   has a past surgical history that includes IR Lower Extremity Angiogram Left (3/28/2018).  FAMILY HISTORY: family history is not on file.  SOCIAL HISTORY:   reports that she has quit smoking. She does not have any smokeless tobacco history on file. She reports that she does not drink alcohol.  Patient's living condition: lives in a skilled nursing facility    Post Discharge Medication Reconciliation Status:   MED REC REQUIRED  Post Medication Reconciliation Status:  Discharge medications reconciled and changed, see notes/orders       Current Outpatient Medications   Medication Sig    acetaminophen (TYLENOL) 500 MG tablet Take 1,000 mg by mouth 3 times daily    albuterol (PROAIR HFA/PROVENTIL HFA/VENTOLIN HFA) 108 (90 Base) MCG/ACT inhaler Inhale 2 puffs into the lungs    arginine (ARGINAID) PACK Take 1 packet by mouth 3 times daily    aspirin (ASPIR-81) 81 MG EC " tablet Take 81 mg by mouth daily.    atorvastatin (LIPITOR) 80 MG tablet Take 40 mg by mouth daily    carvedilol (COREG) 12.5 MG tablet Take 12.5 mg by mouth 2 times daily (with meals)    clopidogrel (PLAVIX) 75 MG tablet Take 75 mg by mouth daily    diphenhydrAMINE HCl 12.5 MG CHEW Take 12.5 mg by mouth every 6 hours as needed for itching or allergies    Fluticasone-Umeclidin-Vilant (TRELEGY ELLIPTA) 100-62.5-25 MCG/ACT oral inhaler Inhale 1 Inhalation into the lungs daily    furosemide (LASIX) 20 MG tablet Take 20 mg by mouth 2 times daily    gabapentin (NEURONTIN) 600 MG tablet Take 600 mg by mouth 3 times daily.    hydrochlorothiazide (HYDRODIURIL) 25 MG tablet Take 1 tablet by mouth daily    insulin aspart (NOVOLOG FLEXPEN) 100 UNIT/ML pen Inject Subcutaneous 3 times daily (with meals)  if 0 - 119 = 0 units;   120 - 149 = 1 unit;   150 - 199 = 2 units;   200 - 249 = 4 units;   250 - 299 = 6 Units;   300 - 349 = 8 units;   350 - 999 = 10 units,    At at bedtime   if 0 - 149 = 0 units;   150 - 199 = 1 unit;   200 - 249 = 2 units;   250 - 299 = 3 units;   300 - 349 = 4 units;   350 - 999 = 5 units    Insulin Aspart FlexPen 100 UNIT/ML SOPN Inject 16 Units Subcutaneous 3 times daily (with meals) (Patient taking differently: Inject 5 Units Subcutaneous 3 times daily (with meals))    insulin glargine (LANTUS PEN) 100 UNIT/ML pen Inject 40 Units Subcutaneous at bedtime    loperamide (IMODIUM) 2 MG capsule Take 2 mg by mouth    melatonin 3 MG tablet Take 1 tablet (3 mg) by mouth at bedtime    Multiple Vitamins-Minerals (JUAN MULTI WOMEN) TBCR Take  by mouth daily.    nitroglycerin (NITROSTAT) 0.4 MG SL tablet Place  under the tongue every 5 minutes as needed.    nystatin (MYCOSTATIN) 296139 UNIT/GM external powder Apply to skin twice a day.    polyethylene glycol (MIRALAX) 17 g packet Take 17 g by mouth    potassium chloride ER (KLOR-CON M) 20 MEQ CR tablet Take 1 tablet by mouth daily    senna-docusate  "(SENOKOT-S/PERICOLACE) 8.6-50 MG tablet Take 1 tablet by mouth 2 times daily as needed for constipation    blood glucose (ONETOUCH VERIO IQ) test strip Test 4 times per day ** Pharmacy allowed to substitute per Patients Insurance    semaglutide (OZEMPIC) 2 MG/3ML pen 0.25 mg weekly on Sundays until seen by endocrinology   okay for patient to use home dose of Ozempic. (Patient not taking: Reported on 11/16/2023)     No current facility-administered medications for this visit.       ROS:  4 point ROS including Respiratory, CV, GI and , other than that noted in the HPI,  is negative    Vitals:  BP (!) 150/70   Pulse 87   Temp 97.3  F (36.3  C)   Resp 18   Ht 1.651 m (5' 5\")   Wt 138.1 kg (304 lb 6.4 oz)   SpO2 95%   BMI 50.65 kg/m    Exam:  GENERAL APPEARANCE:  Alert, in no distress  RESP:  no respiratory distress  PSYCH:  normal insight, judgement and memory, affect and mood normal    Lab/Diagnostic data:  Recent labs in [facility] reviewed by me today.     CT abdomen. (12/2023)   IMPRESSION:    1.  Active gastrointestinal bleed at the junction of the descending and sigmoid colon.  2.  Atherosclerotic disease.  3.  Cortical scarring in the left kidney.  4.  Diverticulosis without diverticulitis      Colonoscopy  Impression:       - The examined portion of the ileum was normal.        - Diverticulosis in the sigmoid colon and in the descending colon. She        most likely had diverticular bleed that has spontaneously resolved.        There was no evidence for active bleeding or stigmata for bleeding        throughout the examined colon or distal ileum.        - A tattoo was seen in the transverse colon. A post-polypectomy scar was        found at the tattoo site. There was no evidence of residual polyp tissue.        - One 4 mm polyp in the sigmoid colon, removed with a cold snare.        Resected and retrieved.        - One 3 mm polyp in the descending colon, removed with a cold snare.        Resected and " retrieved.        - The examination was otherwise normal.   Recommendation:       - [facility].        - Advance diet as tolerated.        - Await pathology results.     ASSESSMENT/PLAN:    (K57.91) Gastrointestinal hemorrhage associated with intestinal diverticulosis  (primary encounter diagnosis)  (Z98.890,  Z86.010) History of colonoscopy with polypectomy  (Z92.89) Hx of transfusion  (D64.9) Anemia, unspecified type  Comment: Pt had a GI bleed.  Received 2 Units PRBC   CT showed Active gastrointestinal bleed at the junction of the descending and sigmoid colon.  Her colonoscopy showed A tattoo was seen in the transverse colon. A post-polypectomy scar was found at the tattoo site. There was no evidence of residual polyp tissue.        - One 4 mm polyp in the sigmoid colon, removed with a cold snare.        Resected and retrieved.        - One 3 mm polyp in the descending colon, removed with a cold snare.   Hgb was 8 at discharge  Plan:    BMP and CBC on 12/18  Awaiting the biopsy results    (I96) Gangrene of left foot (H)  (primary encounter diagnosis)  (Z89.512) Status post below-knee amputation of left lower extremity (H)  Comment: Occurring in 11/2023.  Received antibiotics after the surgery.  On 1/11/2023 developed fever, blood cultures negative.   Continue atorvastatin, Plavix and aspirin.     Her oxycodone and hydroxyzine were discontinued.  She currently has Tylenol as needed for pain.    Plan:   Follow up appointment will be with [facility] on December 18   therapy to evaluate and treat         (E11.52) Type 2 diabetes mellitus with diabetic peripheral angiopathy with gangrene, unspecified whether long term insulin use (H)  (E66.01) morbid obesity   Comment: Chronic.  Currently taking Semaglutide (is on hold) insulin Lantus and aspart with sliding scale.   For secondary prevention she is taking aspirin and statin,  she is allergic to ACEI and ARBS.    Currently taking 40 units of Lantus and was discharged with 48 units.   Plan: follow up with endocrinology on 12/19/2023  Start meal time aspart 5 units TID (was 16 at discharge but will reassess in 1-2 days)        [I25.10] Coronary artery disease involving native coronary artery of native heart without angina pectoris   (I73.9) PAD (peripheral artery disease) (H24)  Comment: chronic.  LLE popliteal artery stent 11/2/23 - Had LLE angiogram, balloon angioplasty, stenting of popliteal artery 11/5 for critical limb threatening ischemia w/ foot wound. D/w IR 12/10.  CAD.  S/p PCI to RCA 2009   Currently taking Plavix and aspirin  Plan: recommendation for 3 months DAPT from 11/23.  DAPT was restarted after GI bleed        (J44.9) Chronic obstructive pulmonary disease, unspecified COPD type (H)  Comment: chronic.  Currently taking inhalers  Plan: Continue with plan of care no changes at this time, adjustment as needed        (I50.30) Heart failure with preserved ejection fraction, NYHA class II (H)  Comment: Chronic.   Currently taking furosemide  Plan:   Continue with plan of care no changes at this time, adjustment as needed        Electronically signed by:  Continue with plan of care no changes at this time, adjustment as needed        45 minutes was spent reviewing medical record [place] [facility], assessing patient, reviewing medical notes from previous primary care provider, talking with pt and answering their questions/concerns, coordinating above plan of care with nursing , SW, therapy and dietitian and patient and reviewed medications with patient and counseled pt. on above plan of care.  Counseled on medications and side effects.

## 2023-12-13 NOTE — LETTER
12/13/2023        RE: Nahed Courtney  [address]  [facility] [place] [facility] [id number]        [facility] GERIATRICS    PRIMARY CARE PROVIDER AND CLINIC:  Lindsay Metz, [place]  Chief Complaint   Patient presents with     Hospital F/U      [facility] Medical Record Number:  [id number]  Place of Service where encounter took place:  [facility] (U) [[id number]]    Nahed Courtney  is a 68 year old  (1955), returned to the above facility from  [facility]. Hospital stay 12/08/23 - 12/12/23. .       Her past medical history includes  Left gangrenous left heel s/p BKA (11/2023)  Type 2 diabetes  Peripheral artery disease.  LLE popliteal artery stent 11/2/23 - Had LLE angiogram, balloon angioplasty, stenting of popliteal artery 11/5 for critical limb threatening ischemia w/ foot wound. D/w IR 12/10. Recommendation for 3 months DAPT. Resumed as per discussion w GI 12/10 with no ongoing bleeding.    COPD oxygen dependent 2 L   HFpEF  GI bleed, likely diverticular   CAD.  S/p PCI to RCA 2009   Morbid obesity          From 11/1/2023 to 11/5/2023, patient was hospitalized for left gangrenous heel wound.          On 11/16/2023, patient's glargine was increased to 44 units/day.     On 11/20/2023, patient's glargine was increased to 48 units daily, her mealtime insulin was increased to 10 units 3 times daily.  She was started on melatonin     on 11/28/2023 her mealtime insulin was increased to 14 units 3 times daily.     On 11/29/2023, patient saw Dr. Hamilton.  Her ruma should remain in.  Continue with daily dressing changes and wearing her IPOP at all times.  She remains nonweightbearing on her left lower extremity.  Follow-up in 2 weeks    On 12/8/2023, she was hospitalized for GI bleed.  Her CT angio this afternoon which showed area of bleeding in the descending/sigmoid colon junction with diverticulosis and without evidence of diverticulitis.  Atherosclerotic  "disease of the abdominal aorta.   Hgb initially was 10.    GI recommended admission for colonoscopy. Received 2u pRBC in ED, due to ongoing BRBPR. Initially admitted to SICU.  Bleeding since stopped and colonoscopy 12/9/23 showed no evidence of brisk ongoing bleeding. Small polyps were removed from sigmoid and descending with pathology pending.  Suspected spontaneous resolution of diverticular bleeding. Plavix and ASA restarted     Today she was sitting in bed comfortably.  VS stable.       CODE STATUS/ADVANCE DIRECTIVES DISCUSSION:  No Order  CPR/Full code   ALLERGIES:   Allergies   Allergen Reactions     Chicken-Derived Products (Egg) Nausea     Other Reaction(s): Gastrointestinal     Lisinopril Cough and Nausea and Vomiting     PN: Other reaction(s): Cough, Vomiting     Losartan Cough     Miconazole Dermatitis     PN: Other reaction(s): Dermatitis     Morphine And Related Other (See Comments)     \"felt like I was dying when I took it\"     Vicodin [Hydrocodone-Acetaminophen]      Egg White (Egg Protein)      Other Reaction(s): Flu Like Symptoms     Hydrocodone-Acetaminophen Anxiety      PAST MEDICAL HISTORY: No past medical history on file.   PAST SURGICAL HISTORY:   has a past surgical history that includes IR Lower Extremity Angiogram Left (3/28/2018).  FAMILY HISTORY: family history is not on file.  SOCIAL HISTORY:   reports that she has quit smoking. She does not have any smokeless tobacco history on file. She reports that she does not drink alcohol.  Patient's living condition: lives in a skilled nursing facility    Post Discharge Medication Reconciliation Status:   MED REC REQUIRED  Post Medication Reconciliation Status:  Discharge medications reconciled and changed, see notes/orders       Current Outpatient Medications   Medication Sig     acetaminophen (TYLENOL) 500 MG tablet Take 1,000 mg by mouth 3 times daily     albuterol (PROAIR HFA/PROVENTIL HFA/VENTOLIN HFA) 108 (90 Base) MCG/ACT inhaler Inhale 2 " puffs into the lungs     arginine (ARGINAID) PACK Take 1 packet by mouth 3 times daily     aspirin (ASPIR-81) 81 MG EC tablet Take 81 mg by mouth daily.     atorvastatin (LIPITOR) 80 MG tablet Take 40 mg by mouth daily     carvedilol (COREG) 12.5 MG tablet Take 12.5 mg by mouth 2 times daily (with meals)     clopidogrel (PLAVIX) 75 MG tablet Take 75 mg by mouth daily     diphenhydrAMINE HCl 12.5 MG CHEW Take 12.5 mg by mouth every 6 hours as needed for itching or allergies     Fluticasone-Umeclidin-Vilant (TRELEGY ELLIPTA) 100-62.5-25 MCG/ACT oral inhaler Inhale 1 Inhalation into the lungs daily     furosemide (LASIX) 20 MG tablet Take 20 mg by mouth 2 times daily     gabapentin (NEURONTIN) 600 MG tablet Take 600 mg by mouth 3 times daily.     hydrochlorothiazide (HYDRODIURIL) 25 MG tablet Take 1 tablet by mouth daily     insulin aspart (NOVOLOG FLEXPEN) 100 UNIT/ML pen Inject Subcutaneous 3 times daily (with meals)  if 0 - 119 = 0 units;   120 - 149 = 1 unit;   150 - 199 = 2 units;   200 - 249 = 4 units;   250 - 299 = 6 Units;   300 - 349 = 8 units;   350 - 999 = 10 units,    At at bedtime   if 0 - 149 = 0 units;   150 - 199 = 1 unit;   200 - 249 = 2 units;   250 - 299 = 3 units;   300 - 349 = 4 units;   350 - 999 = 5 units     Insulin Aspart FlexPen 100 UNIT/ML SOPN Inject 16 Units Subcutaneous 3 times daily (with meals) (Patient taking differently: Inject 5 Units Subcutaneous 3 times daily (with meals))     insulin glargine (LANTUS PEN) 100 UNIT/ML pen Inject 40 Units Subcutaneous at bedtime     loperamide (IMODIUM) 2 MG capsule Take 2 mg by mouth     melatonin 3 MG tablet Take 1 tablet (3 mg) by mouth at bedtime     Multiple Vitamins-Minerals (JUAN MULTI WOMEN) TBCR Take  by mouth daily.     nitroglycerin (NITROSTAT) 0.4 MG SL tablet Place  under the tongue every 5 minutes as needed.     nystatin (MYCOSTATIN) 548466 UNIT/GM external powder Apply to skin twice a day.     polyethylene glycol (MIRALAX) 17 g packet  "Take 17 g by mouth     potassium chloride ER (KLOR-CON M) 20 MEQ CR tablet Take 1 tablet by mouth daily     senna-docusate (SENOKOT-S/PERICOLACE) 8.6-50 MG tablet Take 1 tablet by mouth 2 times daily as needed for constipation     blood glucose (ONETOUCH VERIO IQ) test strip Test 4 times per day ** Pharmacy allowed to substitute per Patients Insurance     semaglutide (OZEMPIC) 2 MG/3ML pen 0.25 mg weekly on Sundays until seen by endocrinology   carrie for patient to use home dose of Ozempic. (Patient not taking: Reported on 11/16/2023)     No current facility-administered medications for this visit.       ROS:  4 point ROS including Respiratory, CV, GI and , other than that noted in the HPI,  is negative    Vitals:  BP (!) 150/70   Pulse 87   Temp 97.3  F (36.3  C)   Resp 18   Ht 1.651 m (5' 5\")   Wt 138.1 kg (304 lb 6.4 oz)   SpO2 95%   BMI 50.65 kg/m    Exam:  GENERAL APPEARANCE:  Alert, in no distress  RESP:  no respiratory distress  PSYCH:  normal insight, judgement and memory, affect and mood normal    Lab/Diagnostic data:  Recent labs in [facility] reviewed by me today.     CT abdomen. (12/2023)   IMPRESSION:    1.  Active gastrointestinal bleed at the junction of the descending and sigmoid colon.  2.  Atherosclerotic disease.  3.  Cortical scarring in the left kidney.  4.  Diverticulosis without diverticulitis      Colonoscopy  Impression:       - The examined portion of the ileum was normal.        - Diverticulosis in the sigmoid colon and in the descending colon. She        most likely had diverticular bleed that has spontaneously resolved.        There was no evidence for active bleeding or stigmata for bleeding        throughout the examined colon or distal ileum.        - A tattoo was seen in the transverse colon. A post-polypectomy scar was        found at the tattoo site. There was no evidence of residual polyp tissue.        - One 4 mm polyp in the sigmoid colon, removed with a cold snare.        " Resected and retrieved.        - One 3 mm polyp in the descending colon, removed with a cold snare.        Resected and retrieved.        - The examination was otherwise normal.   Recommendation:       - [facility].        - Advance diet as tolerated.        - Await pathology results.     ASSESSMENT/PLAN:    (K57.91) Gastrointestinal hemorrhage associated with intestinal diverticulosis  (primary encounter diagnosis)  (Z98.890,  Z86.010) History of colonoscopy with polypectomy  (Z92.89) Hx of transfusion  (D64.9) Anemia, unspecified type  Comment: Pt had a GI bleed.  Received 2 Units PRBC   CT showed Active gastrointestinal bleed at the junction of the descending and sigmoid colon.  Her colonoscopy showed A tattoo was seen in the transverse colon. A post-polypectomy scar was found at the tattoo site. There was no evidence of residual polyp tissue.        - One 4 mm polyp in the sigmoid colon, removed with a cold snare.        Resected and retrieved.        - One 3 mm polyp in the descending colon, removed with a cold snare.   Hgb was 8 at discharge  Plan:    BMP and CBC on 12/18  Awaiting the biopsy results    (I96) Gangrene of left foot (H)  (primary encounter diagnosis)  (Z89.512) Status post below-knee amputation of left lower extremity (H)  Comment: Occurring in 11/2023.  Received antibiotics after the surgery.  On 1/11/2023 developed fever, blood cultures negative.   Continue atorvastatin, Plavix and aspirin.     Her oxycodone and hydroxyzine were discontinued.  She currently has Tylenol as needed for pain.    Plan:   Follow up appointment will be with [facility] on December 18   therapy to evaluate and treat         (E11.52) Type 2 diabetes mellitus with diabetic peripheral angiopathy with gangrene, unspecified whether long term insulin use (H)  (E66.01) morbid obesity   Comment: Chronic.  Currently taking Semaglutide (is on hold) insulin Lantus and aspart with  sliding scale.   For secondary prevention she is taking aspirin and statin,  she is allergic to ACEI and ARBS.   Currently taking 40 units of Lantus and was discharged with 48 units.   Plan: follow up with endocrinology on 12/19/2023  Start meal time aspart 5 units TID (was 16 at discharge but will reassess in 1-2 days)        [I25.10] Coronary artery disease involving native coronary artery of native heart without angina pectoris   (I73.9) PAD (peripheral artery disease) (H24)  Comment: chronic.  LLE popliteal artery stent 11/2/23 - Had LLE angiogram, balloon angioplasty, stenting of popliteal artery 11/5 for critical limb threatening ischemia w/ foot wound. D/w IR 12/10.  CAD.  S/p PCI to RCA 2009   Currently taking Plavix and aspirin  Plan: recommendation for 3 months DAPT from 11/23.  DAPT was restarted after GI bleed        (J44.9) Chronic obstructive pulmonary disease, unspecified COPD type (H)  Comment: chronic.  Currently taking inhalers  Plan: Continue with plan of care no changes at this time, adjustment as needed        (I50.30) Heart failure with preserved ejection fraction, NYHA class II (H)  Comment: Chronic.   Currently taking furosemide  Plan:   Continue with plan of care no changes at this time, adjustment as needed        Electronically signed by:  Continue with plan of care no changes at this time, adjustment as needed        45 minutes was spent reviewing medical record from [facility] [place] hospital, assessing patient, reviewing medical notes from previous primary care provider, talking with pt and answering their questions/concerns, coordinating above plan of care with nursing , SW, therapy and dietitian and patient and reviewed medications with patient and counseled pt. on above plan of care.  Counseled on medications and side effects.                     Sincerely,        ALONZO Romero CNP

## 2023-12-14 ENCOUNTER — TRANSITIONAL CARE UNIT VISIT (OUTPATIENT)
Dept: GERIATRICS | Facility: CLINIC | Age: 68
End: 2023-12-14
Payer: COMMERCIAL

## 2023-12-14 ENCOUNTER — LAB REQUISITION (OUTPATIENT)
Dept: LAB | Facility: CLINIC | Age: 68
End: 2023-12-14
Payer: COMMERCIAL

## 2023-12-14 VITALS
OXYGEN SATURATION: 92 % | SYSTOLIC BLOOD PRESSURE: 155 MMHG | HEIGHT: 65 IN | BODY MASS INDEX: 48.82 KG/M2 | TEMPERATURE: 97.3 F | RESPIRATION RATE: 18 BRPM | WEIGHT: 293 LBS | HEART RATE: 85 BPM | DIASTOLIC BLOOD PRESSURE: 73 MMHG

## 2023-12-14 DIAGNOSIS — Z89.512 STATUS POST BELOW-KNEE AMPUTATION OF LEFT LOWER EXTREMITY (H): ICD-10-CM

## 2023-12-14 DIAGNOSIS — I73.9 PAD (PERIPHERAL ARTERY DISEASE) (H): ICD-10-CM

## 2023-12-14 DIAGNOSIS — E11.52 TYPE 2 DIABETES MELLITUS WITH DIABETIC PERIPHERAL ANGIOPATHY WITH GANGRENE, UNSPECIFIED WHETHER LONG TERM INSULIN USE (H): ICD-10-CM

## 2023-12-14 DIAGNOSIS — Z79.4 TYPE 2 DIABETES MELLITUS WITH DIABETIC PERIPHERAL ANGIOPATHY AND GANGRENE, WITH LONG-TERM CURRENT USE OF INSULIN (H): ICD-10-CM

## 2023-12-14 DIAGNOSIS — Z79.4 LONG TERM (CURRENT) USE OF INSULIN (H): ICD-10-CM

## 2023-12-14 DIAGNOSIS — R73.9 ELEVATED BLOOD SUGAR: ICD-10-CM

## 2023-12-14 DIAGNOSIS — E11.52 TYPE 2 DIABETES MELLITUS WITH DIABETIC PERIPHERAL ANGIOPATHY AND GANGRENE, WITH LONG-TERM CURRENT USE OF INSULIN (H): ICD-10-CM

## 2023-12-14 DIAGNOSIS — R30.0 DYSURIA: ICD-10-CM

## 2023-12-14 DIAGNOSIS — R30.0 DYSURIA: Primary | ICD-10-CM

## 2023-12-14 LAB
ALBUMIN UR-MCNC: NEGATIVE MG/DL
APPEARANCE UR: ABNORMAL
BILIRUB UR QL STRIP: NEGATIVE
COLOR UR AUTO: ABNORMAL
GLUCOSE UR STRIP-MCNC: NEGATIVE MG/DL
HGB UR QL STRIP: NEGATIVE
HYALINE CASTS: 2 /LPF
KETONES UR STRIP-MCNC: NEGATIVE MG/DL
LEUKOCYTE ESTERASE UR QL STRIP: ABNORMAL
NITRATE UR QL: POSITIVE
PH UR STRIP: 5.5 [PH] (ref 5–7)
RBC URINE: 3 /HPF
SP GR UR STRIP: 1.01 (ref 1–1.03)
SQUAMOUS EPITHELIAL: 1 /HPF
TRANSITIONAL EPI: <1 /HPF
UROBILINOGEN UR STRIP-MCNC: NORMAL MG/DL
WBC CLUMPS #/AREA URNS HPF: PRESENT /HPF
WBC URINE: 110 /HPF

## 2023-12-14 PROCEDURE — 99309 SBSQ NF CARE MODERATE MDM 30: CPT | Performed by: NURSE PRACTITIONER

## 2023-12-14 PROCEDURE — 87086 URINE CULTURE/COLONY COUNT: CPT | Mod: ORL | Performed by: INTERNAL MEDICINE

## 2023-12-14 PROCEDURE — 81001 URINALYSIS AUTO W/SCOPE: CPT | Mod: ORL | Performed by: INTERNAL MEDICINE

## 2023-12-14 RX ORDER — INSULIN ASPART 100 [IU]/ML
10 INJECTION, SOLUTION INTRAVENOUS; SUBCUTANEOUS
Start: 2023-12-14 | End: 2023-12-19

## 2023-12-14 NOTE — PROGRESS NOTES
"[facility] GERIATRICS    Chief Complaint   Patient presents with    Diabetes     HPI:  Nahed Courtney is a 68 year old  (1955), who is being seen today for an episodic care visit at: [facility] ([facility]) [[id number]].    Today pt feels dysuria.    No BALTAZAR, No bleeding incision CDI      Allergies, and PMH/PSH reviewed in [facility] today.  REVIEW OF SYSTEMS:  4 point ROS including Respiratory, CV, GI and , other than that noted in the HPI,  is negative    Objective:   BP (!) 155/73   Pulse 85   Temp 97.3  F (36.3  C)   Resp 18   Ht 1.651 m (5' 5\")   Wt 138.1 kg (304 lb 6.4 oz)   SpO2 92%   BMI 50.65 kg/m    GENERAL APPEARANCE:  Alert, in no distress, morbidly obese  RESP:  lungs clear to auscultation , no respiratory distress  CV:  Palpation and auscultation of heart done , rate-normal  SKIN:  incision CDI with steristrips  PSYCH:  oriented X 3    Recent labs in [facility] reviewed by me today.     Assessment/Plan:  (I73.9) PAD (peripheral artery disease) (H24)  I96) Gangrene of left foot (H)  (primary encounter diagnosis)  (Z89.512) Status post below-knee amputation of left lower extremity (H)  (I73.9) PAD (peripheral artery disease) (H24)  Comment: Occurring in 11/2023.  Received antibiotics after the surgery.  On 1/11/2023 developed fever, blood cultures negative.   Continue atorvastatin, Plavix and aspirin.     Her oxycodone and hydroxyzine were discontinued.  She currently has Tylenol as needed for pain.    Her incision was looked at today, staples are out, incision is CDI.    LLE popliteal artery stent 11/2/23 - Had LLE angiogram, balloon angioplasty, stenting of popliteal artery 11/5 for critical limb threatening ischemia w/ foot wound. D/w IR 12/10.  CAD.  S/p PCI to RCA 2009   Currently taking Plavix and aspirin  Plan:   Follow up appointment will be with [facility] on December 18       (R30.0) Dysuria  (primary encounter diagnosis)  Comment: pt UA/UC not positive for UA in " hospital.  Bacterial count less than 100,000.   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 uncontrolled  Plan: increase aspart insulin to 10 units prior to each meal.          MED REC REQUIRED  Post Medication Reconciliation Status:  Medication reconciliation previously completed during another office visit            Electronically signed by:       ALONZO Romero CNP on 12/14/2023 at 1:12 PM

## 2023-12-14 NOTE — LETTER
"    12/14/2023        RE: Nahed Courtney  [address]  [facility] [address]        [facility]    Chief Complaint   Patient presents with     Diabetes     HPI:  Nahed Courtney is a 68 year old  (1955), who is being seen today for an episodic care visit at: [facility] ([facility]) [[id number]].    Today pt feels dysuria.    No BALTAZAR, No bleeding incision CDI      Allergies, and PMH/PSH reviewed in [facility] today.  REVIEW OF SYSTEMS:  4 point ROS including Respiratory, CV, GI and , other than that noted in the HPI,  is negative    Objective:   BP (!) 155/73   Pulse 85   Temp 97.3  F (36.3  C)   Resp 18   Ht 1.651 m (5' 5\")   Wt 138.1 kg (304 lb 6.4 oz)   SpO2 92%   BMI 50.65 kg/m    GENERAL APPEARANCE:  Alert, in no distress, morbidly obese  RESP:  lungs clear to auscultation , no respiratory distress  CV:  Palpation and auscultation of heart done , rate-normal  SKIN:  incision CDI with steristrips  PSYCH:  oriented X 3    Recent labs in [facility] reviewed by me today.     Assessment/Plan:  (I73.9) PAD (peripheral artery disease) (H24)  I96) Gangrene of left foot (H)  (primary encounter diagnosis)  (Z89.512) Status post below-knee amputation of left lower extremity (H)  (I73.9) PAD (peripheral artery disease) (H24)  Comment: Occurring in 11/2023.  Received antibiotics after the surgery.  On 1/11/2023 developed fever, blood cultures negative.   Continue atorvastatin, Plavix and aspirin.     Her oxycodone and hydroxyzine were discontinued.  She currently has Tylenol as needed for pain.    Her incision was looked at today, staples are out, incision is CDI.    LLE popliteal artery stent 11/2/23 - Had LLE angiogram, balloon angioplasty, stenting of popliteal artery 11/5 for critical limb threatening ischemia w/ foot wound. D/w IR 12/10.  CAD.  S/p PCI to RCA 2009   Currently taking Plavix and aspirin  Plan:   Follow up appointment will be with [facility] on " December 18       (R30.0) Dysuria  (primary encounter diagnosis)  Comment: pt UA/UC not positive for UA in hospital.  Bacterial count less than 100,000.   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 uncontrolled  Plan: increase aspart insulin to 10 units prior to each meal.          MED REC REQUIRED  Post Medication Reconciliation Status:  Medication reconciliation previously completed during another office visit            Electronically signed by:       ALONZO Romero CNP on 12/14/2023 at 1:12 PM           Sincerely,        ALONZO Romero CNP

## 2023-12-15 ENCOUNTER — TELEPHONE (OUTPATIENT)
Dept: GERIATRICS | Facility: CLINIC | Age: 68
End: 2023-12-15
Payer: COMMERCIAL

## 2023-12-15 ENCOUNTER — LAB REQUISITION (OUTPATIENT)
Dept: LAB | Facility: CLINIC | Age: 68
End: 2023-12-15
Payer: COMMERCIAL

## 2023-12-15 DIAGNOSIS — N30.00 ACUTE CYSTITIS WITHOUT HEMATURIA: Primary | ICD-10-CM

## 2023-12-15 DIAGNOSIS — E08.65 DIABETES MELLITUS DUE TO UNDERLYING CONDITION WITH HYPERGLYCEMIA (H): ICD-10-CM

## 2023-12-15 LAB — BACTERIA UR CULT: NORMAL

## 2023-12-15 RX ORDER — NITROFURANTOIN 25; 75 MG/1; MG/1
100 CAPSULE ORAL 2 TIMES DAILY
Start: 2023-12-15 | End: 2023-12-20

## 2023-12-15 NOTE — TELEPHONE ENCOUNTER
Nahed Courtney  1955      Orders for UA    (N30.00) Acute cystitis without hematuria  (primary encounter diagnosis)  Start    nitroFURantoin macrocrystal-monohydrate (MACROBID) 100 MG capsule twice daily by mouth for 5 days.        Yadira Juarez, ALONZO CNP on 12/15/2023 at 7:24 AM

## 2023-12-18 ENCOUNTER — TELEPHONE (OUTPATIENT)
Dept: GERIATRICS | Facility: CLINIC | Age: 68
End: 2023-12-18
Payer: COMMERCIAL

## 2023-12-18 DIAGNOSIS — E87.6 HYPOKALEMIA: Primary | ICD-10-CM

## 2023-12-18 LAB
ANION GAP SERPL CALCULATED.3IONS-SCNC: 13 MMOL/L (ref 7–15)
BUN SERPL-MCNC: 20.4 MG/DL (ref 8–23)
CALCIUM SERPL-MCNC: 9.8 MG/DL (ref 8.8–10.2)
CHLORIDE SERPL-SCNC: 94 MMOL/L (ref 98–107)
CREAT SERPL-MCNC: 0.91 MG/DL (ref 0.51–0.95)
DEPRECATED HCO3 PLAS-SCNC: 30 MMOL/L (ref 22–29)
EGFRCR SERPLBLD CKD-EPI 2021: 69 ML/MIN/1.73M2
ERYTHROCYTE [DISTWIDTH] IN BLOOD BY AUTOMATED COUNT: 17.4 % (ref 10–15)
GLUCOSE SERPL-MCNC: 207 MG/DL (ref 70–99)
HCT VFR BLD AUTO: 28 % (ref 35–47)
HGB BLD-MCNC: 8.6 G/DL (ref 11.7–15.7)
MCH RBC QN AUTO: 28.4 PG (ref 26.5–33)
MCHC RBC AUTO-ENTMCNC: 30.7 G/DL (ref 31.5–36.5)
MCV RBC AUTO: 92 FL (ref 78–100)
PLATELET # BLD AUTO: 301 10E3/UL (ref 150–450)
POTASSIUM SERPL-SCNC: 3.3 MMOL/L (ref 3.4–5.3)
RBC # BLD AUTO: 3.03 10E6/UL (ref 3.8–5.2)
SODIUM SERPL-SCNC: 137 MMOL/L (ref 135–145)
WBC # BLD AUTO: 8.7 10E3/UL (ref 4–11)

## 2023-12-18 PROCEDURE — 85027 COMPLETE CBC AUTOMATED: CPT | Performed by: NURSE PRACTITIONER

## 2023-12-18 PROCEDURE — 36415 COLL VENOUS BLD VENIPUNCTURE: CPT | Performed by: NURSE PRACTITIONER

## 2023-12-18 PROCEDURE — 82374 ASSAY BLOOD CARBON DIOXIDE: CPT | Performed by: NURSE PRACTITIONER

## 2023-12-18 PROCEDURE — P9604 ONE-WAY ALLOW PRORATED TRIP: HCPCS | Performed by: NURSE PRACTITIONER

## 2023-12-18 PROCEDURE — 82565 ASSAY OF CREATININE: CPT | Performed by: NURSE PRACTITIONER

## 2023-12-18 RX ORDER — POTASSIUM CHLORIDE 1500 MG/1
40 TABLET, EXTENDED RELEASE ORAL ONCE
Start: 2023-12-18 | End: 2023-12-18

## 2023-12-18 RX ORDER — POTASSIUM CHLORIDE 1500 MG/1
40 TABLET, EXTENDED RELEASE ORAL DAILY
Start: 2023-12-19

## 2023-12-19 ENCOUNTER — TRANSITIONAL CARE UNIT VISIT (OUTPATIENT)
Dept: GERIATRICS | Facility: CLINIC | Age: 68
End: 2023-12-19
Payer: COMMERCIAL

## 2023-12-19 VITALS
BODY MASS INDEX: 48.82 KG/M2 | OXYGEN SATURATION: 95 % | WEIGHT: 293 LBS | SYSTOLIC BLOOD PRESSURE: 140 MMHG | HEART RATE: 73 BPM | RESPIRATION RATE: 16 BRPM | TEMPERATURE: 97.2 F | DIASTOLIC BLOOD PRESSURE: 67 MMHG | HEIGHT: 65 IN

## 2023-12-19 DIAGNOSIS — I50.30 HEART FAILURE WITH PRESERVED EJECTION FRACTION, NYHA CLASS II (H): ICD-10-CM

## 2023-12-19 DIAGNOSIS — Z79.4 TYPE 2 DIABETES MELLITUS WITH DIABETIC PERIPHERAL ANGIOPATHY AND GANGRENE, WITH LONG-TERM CURRENT USE OF INSULIN (H): ICD-10-CM

## 2023-12-19 DIAGNOSIS — E11.52 TYPE 2 DIABETES MELLITUS WITH DIABETIC PERIPHERAL ANGIOPATHY AND GANGRENE, WITH LONG-TERM CURRENT USE OF INSULIN (H): ICD-10-CM

## 2023-12-19 DIAGNOSIS — E87.6 HYPOKALEMIA: Primary | ICD-10-CM

## 2023-12-19 DIAGNOSIS — N30.00 ACUTE CYSTITIS WITHOUT HEMATURIA: ICD-10-CM

## 2023-12-19 DIAGNOSIS — Z79.4 LONG TERM (CURRENT) USE OF INSULIN (H): ICD-10-CM

## 2023-12-19 PROCEDURE — 99309 SBSQ NF CARE MODERATE MDM 30: CPT | Performed by: NURSE PRACTITIONER

## 2023-12-19 RX ORDER — INSULIN ASPART 100 [IU]/ML
14 INJECTION, SOLUTION INTRAVENOUS; SUBCUTANEOUS
Start: 2023-12-19 | End: 2024-01-21

## 2023-12-19 NOTE — PROGRESS NOTES
Nahed Courtney  1955      Orders  Discontinue diphenhydrAMINE  Discontinue Bisacody  Stop current  Aspart insulin  Start insulin aspart 14 units subcutaneously three times daily at meals. Hold for NPO, vomiting.     ALONZO Romero CNP on 12/19/2023 at 7:06 AM

## 2023-12-19 NOTE — PROGRESS NOTES
[facility] GERIATRICS        Visit Type: RECHECK     Facility:   [facility] ([facility]) [[id number]]         History of Present Illness: Nahed Courtney is a 68 year old female     Her past medical history includes  Left gangrenous left heel s/p BKA (11/2023)  Type 2 diabetes  Peripheral artery disease.  LLE popliteal artery stent 11/2/23 - Had LLE angiogram, balloon angioplasty, stenting of popliteal artery 11/5 for critical limb threatening ischemia w/ foot wound. D/w IR 12/10. Recommendation for 3 months DAPT. Resumed as per discussion w GI 12/10 with no ongoing bleeding.    COPD oxygen dependent 2 L   HFpEF  GI bleed, likely diverticular (12/2023)   CAD.  S/p PCI to RCA 2009   Morbid obesity           From 11/1/2023 to 11/5/2023, patient was hospitalized for left gangrenous heel wound.          On 11/16/2023, patient's glargine was increased to 44 units/day.     On 11/20/2023, patient's glargine was increased to 48 units daily, her mealtime insulin was increased to 10 units 3 times daily.  She was started on melatonin     on 11/28/2023 her mealtime insulin was increased to 14 units 3 times daily.     On 11/29/2023, patient saw Dr. Hamilton.  Her ruma should remain in.  Continue with daily dressing changes and wearing her IPOP at all times.  She remains nonweightbearing on her left lower extremity.  Follow-up in 2 weeks     On 12/8/2023, she was hospitalized for GI bleed.  Her CT angio this afternoon which showed area of bleeding in the descending/sigmoid colon junction with diverticulosis and without evidence of diverticulitis.  Atherosclerotic disease of the abdominal aorta.   Hgb initially was 10.    GI recommended admission for colonoscopy. Received 2u pRBC in ED, due to ongoing BRBPR. Initially admitted to SICU.  Bleeding since stopped and colonoscopy 12/9/23 showed no evidence of brisk ongoing bleeding. Small polyps were removed from sigmoid and descending with pathology pending.   Suspected spontaneous resolution of diverticular bleeding. Plavix and ASA restarted     On 12/15/2023, she was treated for UTI    On 12/18/23, her potassium was 3.3 and replaced.     Today her weight is stable  Blood sugars elevated.   Per nursing,  MOD Ax1 with bathing, and lower body dressing. MIN Ax1 upper body dressing, SBA with grooming and oral cares. Total dependence with toileting using a bedpan. PT only with Ambulation. MOD Ax2 with bed mobility. MIN Ax2 with transfers using a sliding board.Independent with locomotion when in her w/c. Feeds herself after set up.   incontinent of both bowel and bladder    Current Outpatient Medications   Medication Sig Dispense Refill    acetaminophen (TYLENOL) 500 MG tablet Take 1,000 mg by mouth 3 times daily      albuterol (PROAIR HFA/PROVENTIL HFA/VENTOLIN HFA) 108 (90 Base) MCG/ACT inhaler Inhale 2 puffs into the lungs      arginine (ARGINAID) PACK Take 1 packet by mouth 3 times daily      aspirin (ASPIR-81) 81 MG EC tablet Take 81 mg by mouth daily.      atorvastatin (LIPITOR) 80 MG tablet Take 40 mg by mouth daily      carvedilol (COREG) 12.5 MG tablet Take 12.5 mg by mouth 2 times daily (with meals)      clopidogrel (PLAVIX) 75 MG tablet Take 75 mg by mouth daily      Fluticasone-Umeclidin-Vilant (TRELEGY ELLIPTA) 100-62.5-25 MCG/ACT oral inhaler Inhale 1 Inhalation into the lungs daily      furosemide (LASIX) 20 MG tablet Take 20 mg by mouth 2 times daily      gabapentin (NEURONTIN) 800 MG tablet Take 800 mg by mouth 3 times daily      hydrochlorothiazide (HYDRODIURIL) 25 MG tablet Take 1 tablet by mouth daily      insulin aspart (NOVOLOG FLEXPEN) 100 UNIT/ML pen Inject Subcutaneous 3 times daily (with meals)  if 0 - 119 = 0 units;   120 - 149 = 1 unit;   150 - 199 = 2 units;   200 - 249 = 4 units;   250 - 299 = 6 Units;   300 - 349 = 8 units;   350 - 999 = 10 units,    At at bedtime   if 0 - 149 = 0 units;   150 - 199 = 1 unit;   200 - 249 = 2 units;   250 - 299 =  "3 units;   300 - 349 = 4 units;   350 - 999 = 5 units      Insulin Aspart FlexPen 100 UNIT/ML SOPN Inject 14 Units Subcutaneous 3 times daily (with meals)      insulin glargine (LANTUS PEN) 100 UNIT/ML pen Inject 40 Units Subcutaneous at bedtime      loperamide (IMODIUM) 2 MG capsule Take 2 mg by mouth      melatonin 3 MG tablet Take 1 tablet (3 mg) by mouth at bedtime      Multiple Vitamins-Minerals (JUAN MULTI WOMEN) TBCR Take  by mouth daily.      nitroFURantoin macrocrystal-monohydrate (MACROBID) 100 MG capsule Take 1 capsule (100 mg) by mouth 2 times daily for 5 days      nitroglycerin (NITROSTAT) 0.4 MG SL tablet Place  under the tongue every 5 minutes as needed.      nystatin (MYCOSTATIN) 065713 UNIT/GM external powder Apply to skin twice a day.      polyethylene glycol (MIRALAX) 17 g packet Take 17 g by mouth      potassium chloride ER (KLOR-CON M) 20 MEQ CR tablet Take 2 tablets (40 mEq) by mouth daily      senna-docusate (SENOKOT-S/PERICOLACE) 8.6-50 MG tablet Take 1 tablet by mouth 2 times daily as needed for constipation      blood glucose (ONETOUCH VERIO IQ) test strip Test 4 times per day ** Pharmacy allowed to substitute per Patients Insurance      semaglutide (OZEMPIC) 2 MG/3ML pen 0.25 mg weekly on Sundays until seen by endocrinology   carrie for patient to use home dose of Ozempic. (Patient not taking: Reported on 11/16/2023)         ALLERGIES:Chicken-derived products (egg), Lisinopril, Losartan, Miconazole, Morphine and related, Vicodin [hydrocodone-acetaminophen], Egg white (egg protein), and Hydrocodone-acetaminophen    Vitals:  BP (!) 140/67   Pulse 73   Temp 97.2  F (36.2  C)   Resp 16   Ht 1.651 m (5' 5\")   Wt 138.1 kg (304 lb 6.4 oz)   SpO2 95%   BMI 50.65 kg/m    Body mass index is 50.65 kg/m .    Review of Systems   All other systems reviewed and are negative.         Physical Exam  Vitals and nursing note reviewed.   Constitutional:       Appearance: She is obese.   Pulmonary:      " Effort: Pulmonary effort is normal.   Skin:     General: Skin is warm.      Comments: Incision on BKA healing   Neurological:      Mental Status: She is alert. Mental status is at baseline.   Psychiatric:         Mood and Affect: Mood normal.         Thought Content: Thought content normal.           Labs:     Most Recent 3 CBC's:  Recent Labs   Lab Test 12/18/23  0545 11/15/23  0611   WBC 8.7 9.8   HGB 8.6* 10.5*   MCV 92 91    334     Most Recent 3 BMP's:  Recent Labs   Lab Test 12/18/23  0545 12/06/23  0645 11/15/23  0611    140 138   POTASSIUM 3.3* 3.5 3.5   CHLORIDE 94* 97* 94*   CO2 30* 30* 32*   BUN 20.4 18.0 15.5   CR 0.91 0.87 0.93   ANIONGAP 13 13 12   ROBINA 9.8 9.2 8.9   * 155* 186*       Assessment/Plan:  (E11.52,  Z79.4) Type 2 diabetes mellitus with diabetic peripheral angiopathy and gangrene, with long-term current use of insulin (H)  Comment: Chronic.  Currently taking Semaglutide (on hold) , insulin Lantus and aspart with sliding scale.   For secondary prevention she is taking aspirin and statin,  she is allergic to ACEI and ARBS.    Plan:   Increase meal time aspart to 14 units.   Currently taking glargine 40 units, Was on Glargine 48 units Prior to hospital admission  Going to Endocrinology today             (E87.6) Hypokalemia  (primary encounter diagnosis)  (I50.30) Heart failure with preserved ejection fraction, NYHA class II (H)  Comment: Chronic.   Currently taking furosemide.    I personally reviewed the BMP on 12/18/23 and her potassium is low.   Plan:   Start potassium chloride 40 mEq daily   Repeat bmp on 12/27    N30.00) Acute cystitis without hematuria   Comment; last day of antibiotics is 12/19/23   symptoms resolved.   Plan: Continue with plan of care no changes at this time, adjustment as needed    Electronically signed by:    ALONZO Romero CNP on 12/19/2023 at 1:37 PM    MEDICATIONS:  MED REC REQUIRED  Post Medication Reconciliation Status:  Discharge  medications reconciled and changed, see notes/orders

## 2023-12-19 NOTE — LETTER
12/19/2023        RE: Nahed Courtney  [address]  [facility] [address]         [facility] GERIATRICS        Visit Type: RECHECK     Facility:   [facility] ([facility]) [[id number]]         History of Present Illness: Nahed Courtney is a 68 year old female     Her past medical history includes  Left gangrenous left heel s/p BKA (11/2023)  Type 2 diabetes  Peripheral artery disease.  LLE popliteal artery stent 11/2/23 - Had LLE angiogram, balloon angioplasty, stenting of popliteal artery 11/5 for critical limb threatening ischemia w/ foot wound. D/w IR 12/10. Recommendation for 3 months DAPT. Resumed as per discussion w GI 12/10 with no ongoing bleeding.    COPD oxygen dependent 2 L   HFpEF  GI bleed, likely diverticular (12/2023)   CAD.  S/p PCI to RCA 2009   Morbid obesity           From 11/1/2023 to 11/5/2023, patient was hospitalized for left gangrenous heel wound.          On 11/16/2023, patient's glargine was increased to 44 units/day.     On 11/20/2023, patient's glargine was increased to 48 units daily, her mealtime insulin was increased to 10 units 3 times daily.  She was started on melatonin     on 11/28/2023 her mealtime insulin was increased to 14 units 3 times daily.     On 11/29/2023, patient saw Dr. Hamilton.  Her ruma should remain in.  Continue with daily dressing changes and wearing her IPOP at all times.  She remains nonweightbearing on her left lower extremity.  Follow-up in 2 weeks     On 12/8/2023, she was hospitalized for GI bleed.  Her CT angio this afternoon which showed area of bleeding in the descending/sigmoid colon junction with diverticulosis and without evidence of diverticulitis.  Atherosclerotic disease of the abdominal aorta.   Hgb initially was 10.    GI recommended admission for colonoscopy. Received 2u pRBC in ED, due to ongoing BRBPR. Initially admitted to SICU.  Bleeding since stopped and colonoscopy 12/9/23 showed no evidence of  brisk ongoing bleeding. Small polyps were removed from sigmoid and descending with pathology pending.  Suspected spontaneous resolution of diverticular bleeding. Plavix and ASA restarted     On 12/15/2023, she was treated for UTI    On 12/18/23, her potassium was 3.3 and replaced.     Today her weight is stable  Blood sugars elevated.   Per nursing,  MOD Ax1 with bathing, and lower body dressing. MIN Ax1 upper body dressing, SBA with grooming and oral cares. Total dependence with toileting using a bedpan. PT only with Ambulation. MOD Ax2 with bed mobility. MIN Ax2 with transfers using a sliding board.Independent with locomotion when in her w/c. Feeds herself after set up.   incontinent of both bowel and bladder    Current Outpatient Medications   Medication Sig Dispense Refill     acetaminophen (TYLENOL) 500 MG tablet Take 1,000 mg by mouth 3 times daily       albuterol (PROAIR HFA/PROVENTIL HFA/VENTOLIN HFA) 108 (90 Base) MCG/ACT inhaler Inhale 2 puffs into the lungs       arginine (ARGINAID) PACK Take 1 packet by mouth 3 times daily       aspirin (ASPIR-81) 81 MG EC tablet Take 81 mg by mouth daily.       atorvastatin (LIPITOR) 80 MG tablet Take 40 mg by mouth daily       carvedilol (COREG) 12.5 MG tablet Take 12.5 mg by mouth 2 times daily (with meals)       clopidogrel (PLAVIX) 75 MG tablet Take 75 mg by mouth daily       Fluticasone-Umeclidin-Vilant (TRELEGY ELLIPTA) 100-62.5-25 MCG/ACT oral inhaler Inhale 1 Inhalation into the lungs daily       furosemide (LASIX) 20 MG tablet Take 20 mg by mouth 2 times daily       gabapentin (NEURONTIN) 800 MG tablet Take 800 mg by mouth 3 times daily       hydrochlorothiazide (HYDRODIURIL) 25 MG tablet Take 1 tablet by mouth daily       insulin aspart (NOVOLOG FLEXPEN) 100 UNIT/ML pen Inject Subcutaneous 3 times daily (with meals)  if 0 - 119 = 0 units;   120 - 149 = 1 unit;   150 - 199 = 2 units;   200 - 249 = 4 units;   250 - 299 = 6 Units;   300 - 349 = 8 units;   350 -  "999 = 10 units,    At at bedtime   if 0 - 149 = 0 units;   150 - 199 = 1 unit;   200 - 249 = 2 units;   250 - 299 = 3 units;   300 - 349 = 4 units;   350 - 999 = 5 units       Insulin Aspart FlexPen 100 UNIT/ML SOPN Inject 14 Units Subcutaneous 3 times daily (with meals)       insulin glargine (LANTUS PEN) 100 UNIT/ML pen Inject 40 Units Subcutaneous at bedtime       loperamide (IMODIUM) 2 MG capsule Take 2 mg by mouth       melatonin 3 MG tablet Take 1 tablet (3 mg) by mouth at bedtime       Multiple Vitamins-Minerals (JUAN MULTI WOMEN) TBCR Take  by mouth daily.       nitroFURantoin macrocrystal-monohydrate (MACROBID) 100 MG capsule Take 1 capsule (100 mg) by mouth 2 times daily for 5 days       nitroglycerin (NITROSTAT) 0.4 MG SL tablet Place  under the tongue every 5 minutes as needed.       nystatin (MYCOSTATIN) 347923 UNIT/GM external powder Apply to skin twice a day.       polyethylene glycol (MIRALAX) 17 g packet Take 17 g by mouth       potassium chloride ER (KLOR-CON M) 20 MEQ CR tablet Take 2 tablets (40 mEq) by mouth daily       senna-docusate (SENOKOT-S/PERICOLACE) 8.6-50 MG tablet Take 1 tablet by mouth 2 times daily as needed for constipation       blood glucose (ONETOUCH VERIO IQ) test strip Test 4 times per day ** Pharmacy allowed to substitute per Patients Insurance       semaglutide (OZEMPIC) 2 MG/3ML pen 0.25 mg weekly on Sundays until seen by endocrinology   carrie for patient to use home dose of Ozempic. (Patient not taking: Reported on 11/16/2023)         ALLERGIES:Chicken-derived products (egg), Lisinopril, Losartan, Miconazole, Morphine and related, Vicodin [hydrocodone-acetaminophen], Egg white (egg protein), and Hydrocodone-acetaminophen    Vitals:  BP (!) 140/67   Pulse 73   Temp 97.2  F (36.2  C)   Resp 16   Ht 1.651 m (5' 5\")   Wt 138.1 kg (304 lb 6.4 oz)   SpO2 95%   BMI 50.65 kg/m    Body mass index is 50.65 kg/m .    Review of Systems   All other systems reviewed and are " negative.         Physical Exam  Vitals and nursing note reviewed.   Constitutional:       Appearance: She is obese.   Pulmonary:      Effort: Pulmonary effort is normal.   Skin:     General: Skin is warm.      Comments: Incision on BKA healing   Neurological:      Mental Status: She is alert. Mental status is at baseline.   Psychiatric:         Mood and Affect: Mood normal.         Thought Content: Thought content normal.           Labs:     Most Recent 3 CBC's:  Recent Labs   Lab Test 12/18/23  0545 11/15/23  0611   WBC 8.7 9.8   HGB 8.6* 10.5*   MCV 92 91    334     Most Recent 3 BMP's:  Recent Labs   Lab Test 12/18/23  0545 12/06/23  0645 11/15/23  0611    140 138   POTASSIUM 3.3* 3.5 3.5   CHLORIDE 94* 97* 94*   CO2 30* 30* 32*   BUN 20.4 18.0 15.5   CR 0.91 0.87 0.93   ANIONGAP 13 13 12   ROBINA 9.8 9.2 8.9   * 155* 186*       Assessment/Plan:  (E11.52,  Z79.4) Type 2 diabetes mellitus with diabetic peripheral angiopathy and gangrene, with long-term current use of insulin (H)  Comment: Chronic.  Currently taking Semaglutide (on hold) , insulin Lantus and aspart with sliding scale.   For secondary prevention she is taking aspirin and statin,  she is allergic to ACEI and ARBS.    Plan:   Increase meal time aspart to 14 units.   Currently taking glargine 40 units, Was on Glargine 48 units Prior to hospital admission  Going to Endocrinology today             (E87.6) Hypokalemia  (primary encounter diagnosis)  (I50.30) Heart failure with preserved ejection fraction, NYHA class II (H)  Comment: Chronic.   Currently taking furosemide.    I personally reviewed the BMP on 12/18/23 and her potassium is low.   Plan:   Start potassium chloride 40 mEq daily   Repeat bmp on 12/27    N30.00) Acute cystitis without hematuria   Comment; last day of antibiotics is 12/19/23   symptoms resolved.   Plan: Continue with plan of care no changes at this time, adjustment as needed    Electronically signed  by:    ALONZO Romero CNP on 12/19/2023 at 1:37 PM    MEDICATIONS:  MED REC REQUIRED  Post Medication Reconciliation Status:  Discharge medications reconciled and changed, see notes/orders              Nahed Courtney  1955      Orders  Discontinue diphenhydrAMINE  Discontinue Bisacody  Stop current  Aspart insulin  Start insulin aspart 14 units subcutaneously three times daily at meals. Hold for NPO, vomiting.     ALONZO Romero CNP on 12/19/2023 at 7:06 AM        Sincerely,        ALONZO Romero CNP

## 2023-12-19 NOTE — TELEPHONE ENCOUNTER
Pt's potassium was 3.3 today on BMP    Orders   Please give potassium chloride 40 mEq by mouth now  Stop current orders potassium chloride orders  On 12/19/23, Start potassium chloride 40 mEq daily by mouth for hypokalemia.      ALONZO Romero CNP on 12/18/2023 at 6:20 PM

## 2023-12-20 ENCOUNTER — LAB REQUISITION (OUTPATIENT)
Dept: LAB | Facility: CLINIC | Age: 68
End: 2023-12-20
Payer: COMMERCIAL

## 2023-12-20 DIAGNOSIS — R30.0 DYSURIA: ICD-10-CM

## 2023-12-27 ENCOUNTER — TRANSITIONAL CARE UNIT VISIT (OUTPATIENT)
Dept: GERIATRICS | Facility: CLINIC | Age: 68
End: 2023-12-27
Payer: COMMERCIAL

## 2023-12-27 VITALS
WEIGHT: 293 LBS | TEMPERATURE: 97.3 F | HEIGHT: 65 IN | SYSTOLIC BLOOD PRESSURE: 156 MMHG | DIASTOLIC BLOOD PRESSURE: 72 MMHG | BODY MASS INDEX: 48.82 KG/M2 | HEART RATE: 87 BPM | OXYGEN SATURATION: 96 % | RESPIRATION RATE: 18 BRPM

## 2023-12-27 DIAGNOSIS — K57.31 DIVERTICULAR HEMORRHAGE: Primary | ICD-10-CM

## 2023-12-27 DIAGNOSIS — J44.9 CHRONIC OBSTRUCTIVE PULMONARY DISEASE, UNSPECIFIED COPD TYPE (H): ICD-10-CM

## 2023-12-27 DIAGNOSIS — E11.52 TYPE 2 DIABETES MELLITUS WITH DIABETIC PERIPHERAL ANGIOPATHY AND GANGRENE, WITH LONG-TERM CURRENT USE OF INSULIN (H): ICD-10-CM

## 2023-12-27 DIAGNOSIS — Z89.512 STATUS POST BELOW-KNEE AMPUTATION OF LEFT LOWER EXTREMITY (H): ICD-10-CM

## 2023-12-27 DIAGNOSIS — I50.30 HEART FAILURE WITH PRESERVED EJECTION FRACTION, NYHA CLASS II (H): ICD-10-CM

## 2023-12-27 DIAGNOSIS — Z79.4 TYPE 2 DIABETES MELLITUS WITH DIABETIC PERIPHERAL ANGIOPATHY AND GANGRENE, WITH LONG-TERM CURRENT USE OF INSULIN (H): ICD-10-CM

## 2023-12-27 DIAGNOSIS — I73.9 PAD (PERIPHERAL ARTERY DISEASE) (H): ICD-10-CM

## 2023-12-27 DIAGNOSIS — I10 PRIMARY HYPERTENSION: Chronic | ICD-10-CM

## 2023-12-27 DIAGNOSIS — D62 ANEMIA DUE TO BLOOD LOSS, ACUTE: ICD-10-CM

## 2023-12-27 PROCEDURE — 99305 1ST NF CARE MODERATE MDM 35: CPT | Performed by: INTERNAL MEDICINE

## 2023-12-27 NOTE — PROGRESS NOTES
Nahed Courtney is a 68 year old female seen December 27, 2023 at [facility] TCU where she was readmitted after [facility] hospitalization 12/8-12 for diverticular GI bleed with anemia requiring transfusion 2 units pRBCs  Colonoscopy only showed small polyps.   Bleeding resolved and she returned to TCU for ongoing recovery and Rehab.   Initially admitted to TCU in November after left BKA done for left heel gangrene and PAD.    She has been working with therapies and Orthotics to fit a prosthesis and regain ambulation.      Today she is seen in in her room up to .   Reports she is feeling well, minimal pain and therapy is going well.  Just had her fingernails polished.    Eating okay, pre-lunch CBG is 177 today.   She saw Endocrinology last week and semaglutide was restarted for difficult to control DM2     PMH:   DM2 with nephropathy and neuropathy  PAD, s/p left BKA 2023   Diverticular bleed  Anemia   COPD, O2 dependent   HFpEF  CAD  HTN    Past Surgical History:   Procedure Laterality Date    IR LOWER EXTREMITY ANGIOGRAM LEFT  3/28/2018     Social History     Tobacco Use    Smoking status: Former    Smokeless tobacco: Not on file   Substance Use Topics    Alcohol use: No      SH:  Lives alone, AL apartment in [facility]   She has a [facility] , Mary Ann Chacon    Review Of Systems  Skin: negative   Eyes: impaired vision  Ears/Nose/Throat: hearing loss  Respiratory: No shortness of breath, dyspnea on exertion, cough, or hemoptysis  Cardiovascular: lower extremity edema and exercise intolerance  Gastrointestinal: as above  Genitourinary: incontinence  Musculoskeletal: Assist of 2 persons for transfers and bed mobility, NWB LLE   Dependent for toileting and personal cares  Neurologic: negative  Psychiatric: negative  Hematologic/Lymphatic/Immunologic: anemia  Endocrine: diabetes      GENERAL APPEARANCE: alert and no distress, on O2 by nasal canula    BP (!) 156/72   Pulse 87   Temp  "97.3  F (36.3  C)   Resp 18   Ht 1.651 m (5' 5\")   Wt 138.9 kg (306 lb 4.8 oz)   SpO2 96%   BMI 50.97 kg/m     HEENT: normocephalic, no lesion or abnormalities  NECK: no adenopathy, no asymmetry, masses, or scars and thyroid normal to palpation  RESP: lungs clear to auscultation - no rales, rhonchi or wheezes  CV: regular rate and rhythm, normal S1 S2  ABDOMEN:  soft, nontender, no HSM or masses and bowel sounds normal  MS: left stump with  sock in place, trace edema.     RLE without edema, tubi- in place     SKIN: no suspicious lesions or rashes  NEURO: Normal strength and tone, sensory exam grossly normal, and speech normal  PSYCH: affect okay, pleasant   LYMPHATICS: No cervical,  or supraclavicular nodes     Lab Results   Component Value Date     12/18/2023    POTASSIUM 3.3 (L) 12/18/2023    CHLORIDE 94 (L) 12/18/2023    CO2 30 (H) 12/18/2023    ANIONGAP 13 12/18/2023     (H) 12/18/2023    BUN 20.4 12/18/2023    CR 0.91 12/18/2023    GFRESTIMATED 69 12/18/2023    ROBINA 9.8 12/18/2023     Lab Results   Component Value Date    WBC 8.7 12/18/2023      HGB 8.6 12/18/2023     Component Value Date    MCV 92 12/18/2023       12/18/2023      ECHO 11/12/2023      * The left ventricle is normal size. The left ventricular systolic function is normal, quantified ejection fraction is 62%.     * Left ventricular wall motion is grossly normal however, endocardial  definition is limited.     * The right ventricle is normal size with normal right ventricular systolic function.    * Due to inadequate tricuspid regurgitant velocity, unable to calculate right ventricular systolic pressure.     * There is no pericardial effusion.     * Compared to prior study dated 7/9/2021 the LVEF remains in normal range.       IMP/PLAN:    (K57.31) Diverticular hemorrhage  (D62) Anemia due to blood loss, acute  Comment: transfused 2 units pRBCs earlier this month     Continues to have a NCNC anemia   Plan: " follow hgb     (E11.52,  Z79.4) Type 2 diabetes mellitus with diabetic peripheral angiopathy and gangrene, with long-term current use of insulin (H)  Comment: A1C 9.2% on 11/15/23     Plan: semaglutide 0,25 mg/week   Lantus 40 units/day   Insulin aspart 14 units tid with meals and per sliding scale   Gabapentin 800 mg tid     (Z89.512) Status post below-knee amputation of left lower extremity (H)  (I73.9) PAD (peripheral artery disease) (H24)  Comment: on Nov 8   Plan: acetaminophen 1000 mg tid for associated pain   DAPT with aspirin and clopidogrel for 3 months after LLE popliteal stent placed 11/2    Atorvastatin 40 mg/day for secondary prevention   PHYSICAL THERAPY /OCCUPATIONAL THERAPY for transfers, strengthening.   Awaiting ability to fit prosthesis.        (I50.30) Heart failure with preserved ejection fraction, NYHA class II (H)  (I10) Primary hypertension  Comment: she has listed allergies to both ACEI and AARBs   BP Readings from Last 3 Encounters:   12/27/23 (!) 156/72   12/19/23 (!) 140/67   12/14/23 (!) 155/73     Pulse Readings from Last 4 Encounters:   12/27/23 87   12/19/23 73   12/14/23 85   12/12/23 87      Plan: carvedilol 12.5 mg bid, hydrochlorothiazide 25 mg/day >>>consider increasing carvedilol dose   Furosemide 20 mg bid: replace K, increase dose to 40 mEq bid    Monitor exam and BMP      (J44.9) Chronic obstructive pulmonary disease, unspecified COPD type (H)  Comment: past smoker   Plan: Trelegy Ellipta daily and albuterol MDI for rescue   O2 at 2 L/min per nasal canula       Ginny Ruiz MD

## 2023-12-27 NOTE — LETTER
12/27/2023        RE: Nahed Courtney  [address]  [facility] [address]        Nahed Courtney is a 68 year old female seen December 27, 2023 at [facility] TCU where she was readmitted after [facility] hospitalization 12/8-12 for diverticular GI bleed with anemia requiring transfusion 2 units pRBCs  Colonoscopy only showed small polyps.   Bleeding resolved and she returned to TCU for ongoing recovery and Rehab.   Initially admitted to TCU in November after left BKA done for left heel gangrene and PAD.    She has been working with therapies and Orthotics to fit a prosthesis and regain ambulation.      Today she is seen in in her room up to .   Reports she is feeling well, minimal pain and therapy is going well.  Just had her fingernails polished.    Eating okay, pre-lunch CBG is 177 today.   She saw Endocrinology last week and semaglutide was restarted for difficult to control DM2     PMH:   DM2 with nephropathy and neuropathy  PAD, s/p left BKA 2023   Diverticular bleed  Anemia   COPD, O2 dependent   HFpEF  CAD  HTN    Past Surgical History:   Procedure Laterality Date     IR LOWER EXTREMITY ANGIOGRAM LEFT  3/28/2018     Social History     Tobacco Use     Smoking status: Former     Smokeless tobacco: Not on file   Substance Use Topics     Alcohol use: No      SH:  Lives alone, AL apartment in [facility]   She has a [facility] , Mary Ann Chacon    Review Of Systems  Skin: negative   Eyes: impaired vision  Ears/Nose/Throat: hearing loss  Respiratory: No shortness of breath, dyspnea on exertion, cough, or hemoptysis  Cardiovascular: lower extremity edema and exercise intolerance  Gastrointestinal: as above  Genitourinary: incontinence  Musculoskeletal: Assist of 2 persons for transfers and bed mobility, NWB LLE   Dependent for toileting and personal cares  Neurologic: negative  Psychiatric: negative  Hematologic/Lymphatic/Immunologic: anemia  Endocrine:  "diabetes      GENERAL APPEARANCE: alert and no distress, on O2 by nasal canula    BP (!) 156/72   Pulse 87   Temp 97.3  F (36.3  C)   Resp 18   Ht 1.651 m (5' 5\")   Wt 138.9 kg (306 lb 4.8 oz)   SpO2 96%   BMI 50.97 kg/m     HEENT: normocephalic, no lesion or abnormalities  NECK: no adenopathy, no asymmetry, masses, or scars and thyroid normal to palpation  RESP: lungs clear to auscultation - no rales, rhonchi or wheezes  CV: regular rate and rhythm, normal S1 S2  ABDOMEN:  soft, nontender, no HSM or masses and bowel sounds normal  MS: left stump with  sock in place, trace edema.     RLE without edema, tubi- in place     SKIN: no suspicious lesions or rashes  NEURO: Normal strength and tone, sensory exam grossly normal, and speech normal  PSYCH: affect okay, pleasant   LYMPHATICS: No cervical,  or supraclavicular nodes     Lab Results   Component Value Date     12/18/2023    POTASSIUM 3.3 (L) 12/18/2023    CHLORIDE 94 (L) 12/18/2023    CO2 30 (H) 12/18/2023    ANIONGAP 13 12/18/2023     (H) 12/18/2023    BUN 20.4 12/18/2023    CR 0.91 12/18/2023    GFRESTIMATED 69 12/18/2023    ROBINA 9.8 12/18/2023     Lab Results   Component Value Date    WBC 8.7 12/18/2023      HGB 8.6 12/18/2023     Component Value Date    MCV 92 12/18/2023       12/18/2023      ECHO 11/12/2023      * The left ventricle is normal size. The left ventricular systolic function is normal, quantified ejection fraction is 62%.     * Left ventricular wall motion is grossly normal however, endocardial  definition is limited.     * The right ventricle is normal size with normal right ventricular systolic function.    * Due to inadequate tricuspid regurgitant velocity, unable to calculate right ventricular systolic pressure.     * There is no pericardial effusion.     * Compared to prior study dated 7/9/2021 the LVEF remains in normal range.       IMP/PLAN:    (K57.31) Diverticular hemorrhage  (D62) Anemia due to blood " loss, acute  Comment: transfused 2 units pRBCs earlier this month     Continues to have a NCNC anemia   Plan: follow hgb     (E11.52,  Z79.4) Type 2 diabetes mellitus with diabetic peripheral angiopathy and gangrene, with long-term current use of insulin (H)  Comment: A1C 9.2% on 11/15/23     Plan: semaglutide 0,25 mg/week   Lantus 40 units/day   Insulin aspart 14 units tid with meals and per sliding scale   Gabapentin 800 mg tid     (Z89.512) Status post below-knee amputation of left lower extremity (H)  (I73.9) PAD (peripheral artery disease) (H24)  Comment: on Nov 8   Plan: acetaminophen 1000 mg tid for associated pain   DAPT with aspirin and clopidogrel for 3 months after LLE popliteal stent placed 11/2    Atorvastatin 40 mg/day for secondary prevention   PHYSICAL THERAPY /OCCUPATIONAL THERAPY for transfers, strengthening.   Awaiting ability to fit prosthesis.        (I50.30) Heart failure with preserved ejection fraction, NYHA class II (H)  (I10) Primary hypertension  Comment: she has listed allergies to both ACEI and AARBs   BP Readings from Last 3 Encounters:   12/27/23 (!) 156/72   12/19/23 (!) 140/67   12/14/23 (!) 155/73     Pulse Readings from Last 4 Encounters:   12/27/23 87   12/19/23 73   12/14/23 85   12/12/23 87      Plan: carvedilol 12.5 mg bid, hydrochlorothiazide 25 mg/day >>>consider increasing carvedilol dose   Furosemide 20 mg bid: replace K, increase dose to 40 mEq bid    Monitor exam and BMP      (J44.9) Chronic obstructive pulmonary disease, unspecified COPD type (H)  Comment: past smoker   Plan: Trelegy Ellipta daily and albuterol MDI for rescue   O2 at 2 L/min per nasal canula       Ginny Ruiz MD       Sincerely,        Ginny Ruiz MD

## 2023-12-29 ENCOUNTER — DOCUMENTATION ONLY (OUTPATIENT)
Dept: GERIATRICS | Facility: CLINIC | Age: 68
End: 2023-12-29

## 2024-01-03 ENCOUNTER — TELEPHONE (OUTPATIENT)
Dept: GERIATRICS | Facility: CLINIC | Age: 69
End: 2024-01-03
Payer: COMMERCIAL

## 2024-01-03 NOTE — TELEPHONE ENCOUNTER
[facility] NON-EMERGENT ENCOUNTER    Nahed Courtney is a 68 year old  (1955)    Disposition  Pt has been refusing Trelegy inhaler stating that it makes her constipated. Pt has Albuterol inhaler PRN. Hx of COPD and is on continuous oxygen. PCP updated.    ODESSA provided to Roc SCHWARTZ/SNF:  Discontinue Trelegy inhaler due to pt refusal.       Electronically signed by:   Kristen Darnell RN

## 2024-01-10 ENCOUNTER — NURSING HOME VISIT (OUTPATIENT)
Dept: GERIATRICS | Facility: CLINIC | Age: 69
End: 2024-01-10
Payer: COMMERCIAL

## 2024-01-10 ENCOUNTER — TELEPHONE (OUTPATIENT)
Dept: GERIATRICS | Facility: CLINIC | Age: 69
End: 2024-01-10

## 2024-01-10 VITALS
OXYGEN SATURATION: 94 % | RESPIRATION RATE: 18 BRPM | HEIGHT: 65 IN | WEIGHT: 293 LBS | BODY MASS INDEX: 48.82 KG/M2 | HEART RATE: 80 BPM | DIASTOLIC BLOOD PRESSURE: 64 MMHG | TEMPERATURE: 97.7 F | SYSTOLIC BLOOD PRESSURE: 138 MMHG

## 2024-01-10 DIAGNOSIS — E87.6 HYPOKALEMIA: ICD-10-CM

## 2024-01-10 DIAGNOSIS — E11.52 TYPE 2 DIABETES MELLITUS WITH DIABETIC PERIPHERAL ANGIOPATHY AND GANGRENE, WITH LONG-TERM CURRENT USE OF INSULIN (H): Primary | ICD-10-CM

## 2024-01-10 DIAGNOSIS — I50.30 HEART FAILURE WITH PRESERVED EJECTION FRACTION, NYHA CLASS II (H): ICD-10-CM

## 2024-01-10 DIAGNOSIS — Z79.4 TYPE 2 DIABETES MELLITUS WITH DIABETIC PERIPHERAL ANGIOPATHY AND GANGRENE, WITH LONG-TERM CURRENT USE OF INSULIN (H): Primary | ICD-10-CM

## 2024-01-10 DIAGNOSIS — Z79.4 LONG TERM (CURRENT) USE OF INSULIN (H): ICD-10-CM

## 2024-01-10 PROCEDURE — 99309 SBSQ NF CARE MODERATE MDM 30: CPT | Performed by: NURSE PRACTITIONER

## 2024-01-10 NOTE — LETTER
1/10/2024        RE: Nahed Courtney  [address]  [facility] [address]         [facility]      Code Status:  FULL CODE  Visit Type: Establish Care     Facility:   [facility] () [[id number]]         History of Present Illness: Nahed Courtney is a 68 year old female     Her past medical history includes  Left gangrenous left heel s/p BKA (11/2023)  Type 2 diabetes  Peripheral artery disease.  LLE popliteal artery stent 11/2/23 - Had LLE angiogram, balloon angioplasty, stenting of popliteal artery 11/5 for critical limb threatening ischemia w/ foot wound. D/w IR 12/10. Recommendation for 3 months DAPT. Resumed as per discussion w GI 12/10 with no ongoing bleeding.    COPD oxygen dependent 2 L   HFpEF  GI bleed, likely diverticular (12/2023)   CAD.  S/p PCI to RCA 2009   Morbid obesity      From 11/1/2023 to 11/5/2023, patient was hospitalized for left gangrenous heel wound.          On 11/16/2023, patient's glargine was increased to 44 units/day.     On 11/20/2023, patient's glargine was increased to 48 units daily, her mealtime insulin was increased to 10 units 3 times daily.  She was started on melatonin     on 11/28/2023 her mealtime insulin was increased to 14 units 3 times daily.     On 11/29/2023, patient saw Dr. Hamilton.  Her ruma should remain in.  Continue with daily dressing changes and wearing her IPOP at all times.  She remains nonweightbearing on her left lower extremity.  Follow-up in 2 weeks     On 12/8/2023, she was hospitalized for GI bleed.  Her CT angio this afternoon which showed area of bleeding in the descending/sigmoid colon junction with diverticulosis and without evidence of diverticulitis.  Atherosclerotic disease of the abdominal aorta.   Hgb initially was 10.    GI recommended admission for colonoscopy. Received 2u pRBC in ED, due to ongoing BRBPR. Initially admitted to SICU.  Bleeding since stopped and colonoscopy 12/9/23  showed no evidence of brisk ongoing bleeding. Small polyps were removed from sigmoid and descending with pathology pending.  Suspected spontaneous resolution of diverticular bleeding. Plavix and ASA restarted      On 12/15/2023, she was treated for UTI     On 12/18/23, her potassium was 3.3 and replaced.          Current BS       Today her weight is up  VS stable.   Incision healed.         Current Outpatient Medications   Medication Sig Dispense Refill     acetaminophen (TYLENOL) 500 MG tablet Take 1,000 mg by mouth 3 times daily       albuterol (PROAIR HFA/PROVENTIL HFA/VENTOLIN HFA) 108 (90 Base) MCG/ACT inhaler Inhale 2 puffs into the lungs       arginine (ARGINAID) PACK Take 1 packet by mouth 3 times daily       aspirin (ASPIR-81) 81 MG EC tablet Take 81 mg by mouth daily.       atorvastatin (LIPITOR) 80 MG tablet Take 40 mg by mouth daily       carvedilol (COREG) 12.5 MG tablet Take 12.5 mg by mouth 2 times daily (with meals)       clopidogrel (PLAVIX) 75 MG tablet Take 75 mg by mouth daily       furosemide (LASIX) 20 MG tablet Take 20 mg by mouth 2 times daily       gabapentin (NEURONTIN) 800 MG tablet Take 800 mg by mouth 3 times daily       hydrochlorothiazide (HYDRODIURIL) 25 MG tablet Take 1 tablet by mouth daily       insulin aspart (NOVOLOG FLEXPEN) 100 UNIT/ML pen Inject Subcutaneous 3 times daily (with meals)  if 0 - 119 = 0 units;   120 - 149 = 1 unit;   150 - 199 = 2 units;   200 - 249 = 4 units;   250 - 299 = 6 Units;   300 - 349 = 8 units;   350 - 999 = 10 units,    At at bedtime   if 0 - 149 = 0 units;   150 - 199 = 1 unit;   200 - 249 = 2 units;   250 - 299 = 3 units;   300 - 349 = 4 units;   350 - 999 = 5 units       Insulin Aspart FlexPen 100 UNIT/ML SOPN Inject 30 Units Subcutaneous 3 times daily (with meals)       insulin glargine (LANTUS PEN) 100 UNIT/ML pen Inject 55 Units Subcutaneous at bedtime       loperamide (IMODIUM) 2 MG capsule Take 2 mg by mouth       melatonin 3 MG tablet Take 1  "tablet (3 mg) by mouth at bedtime       Multiple Vitamins-Minerals (JUAN MULTI WOMEN) TBCR Take  by mouth daily.       nitroglycerin (NITROSTAT) 0.4 MG SL tablet Place  under the tongue every 5 minutes as needed.       nystatin (MYCOSTATIN) 101267 UNIT/GM external powder Apply to skin twice a day.       polyethylene glycol (MIRALAX) 17 g packet Take 17 g by mouth       potassium chloride ER (KLOR-CON M) 20 MEQ CR tablet Take 2 tablets (40 mEq) by mouth daily       semaglutide (OZEMPIC) 2 MG/3ML pen Inject 0.25 mg Subcutaneous every 7 days       semaglutide (OZEMPIC) 2 MG/3ML pen        senna-docusate (SENOKOT-S/PERICOLACE) 8.6-50 MG tablet Take 1 tablet by mouth 2 times daily as needed for constipation       blood glucose (ONETOUCH VERIO IQ) test strip Test 4 times per day ** Pharmacy allowed to substitute per Patients Insurance         ALLERGIES:Chicken-derived products (egg), Lisinopril, Losartan, Miconazole, Morphine and related, Vicodin [hydrocodone-acetaminophen], Egg white (egg protein), and Hydrocodone-acetaminophen    Vitals:  /64   Pulse 80   Temp 97.7  F (36.5  C)   Resp 18   Ht 1.651 m (5' 5\")   Wt 141.5 kg (312 lb)   SpO2 94%   BMI 51.92 kg/m    Body mass index is 51.92 kg/m .    Review of Systems   All other systems reviewed and are negative.         Physical Exam  Vitals and nursing note reviewed.   Cardiovascular:      Rate and Rhythm: Normal rate.   Pulmonary:      Effort: Pulmonary effort is normal.      Breath sounds: Normal breath sounds.   Neurological:      General: No focal deficit present.      Mental Status: She is oriented to person, place, and time.   Psychiatric:         Mood and Affect: Mood normal.         Behavior: Behavior normal.         Thought Content: Thought content normal.           Labs:     Most Recent 3 CBC's:  Recent Labs   Lab Test 12/18/23  0545 11/15/23  0611   WBC 8.7 9.8   HGB 8.6* 10.5*   MCV 92 91    334     Most Recent 3 BMP's:  Recent Labs   Lab " Test 12/18/23  0545 12/06/23  0645 11/15/23  0611    140 138   POTASSIUM 3.3* 3.5 3.5   CHLORIDE 94* 97* 94*   CO2 30* 30* 32*   BUN 20.4 18.0 15.5   CR 0.91 0.87 0.93   ANIONGAP 13 13 12   ROBINA 9.8 9.2 8.9   * 155* 186*         Most Recent Hemoglobin A1c:  Recent Labs   Lab Test 11/15/23  0611   A1C 9.2*         Assessment/Plan:  (E11.52,  Z79.4) Type 2 diabetes mellitus with diabetic peripheral angiopathy and gangrene, with long-term current use of insulin (H)  Comment: Chronic.  Currently taking Semaglutide and will need to increase in a few weeks , insulin Lantus and aspart with sliding scale.     For primary prevention she is taking aspirin and statin,  she is allergic to ACEI and ARBS.    Plan:   Increase semaglutide to 0.5 in 4 weeks after starting              (E87.6) Hypokalemia  (primary encounter diagnosis)  (I50.30) Heart failure with preserved ejection fraction, NYHA class II (H)  Comment: Chronic.   Currently taking furosemide and hydrochlorothiazide.      Potassium 40 mEq daily  Plan:   BMP and CBC and hgb A1c             Electronically signed by:      ALONZO Romero CNP       MEDICATIONS:  MED REC REQUIRED  Post Medication Reconciliation Status:  Discharge medications reconciled and changed, see notes/orders                Nahed Courtney  1955      Orders  CMP, CBC,lipid levels, hgb A1c and vitamin D level next lab day.   Dx DM2    ALONZO Romero CNP on 1/21/2024 at 7:18 PM        Sincerely,        ALONZO Romero CNP

## 2024-01-10 NOTE — TELEPHONE ENCOUNTER
Prior Authorization Retail Medication Request    Medication/Dose: Ozempic (0.25 or 0.5MG/DOSE) 2MG/3ML Pen-injectors  ICD code (if different than what is on RX):  E11.52; Z79.4  Previously Tried and Failed:  Lantus; Insulin aspart  Rationale:  Inject 0.25MG subcutaneously weekly    Insurance Name:  [facility]  Insurance ID:  [id number]       Pharmacy Information (if different than what is on RX)  Name:  [facility] - [address], [facility], [address]   Phone: [phone]  Fax:  [phone]

## 2024-01-10 NOTE — PROGRESS NOTES
[facility] GERIATRICS      Code Status:  FULL CODE  Visit Type: Establish Care     Facility:   [facility] ([facility]) [[id number]]         History of Present Illness: Nahed Courtney is a 68 year old female     Her past medical history includes  Left gangrenous left heel s/p BKA (11/2023)  Type 2 diabetes  Peripheral artery disease.  LLE popliteal artery stent 11/2/23 - Had LLE angiogram, balloon angioplasty, stenting of popliteal artery 11/5 for critical limb threatening ischemia w/ foot wound. D/w IR 12/10. Recommendation for 3 months DAPT. Resumed as per discussion w GI 12/10 with no ongoing bleeding.    COPD oxygen dependent 2 L   HFpEF  GI bleed, likely diverticular (12/2023)   CAD.  S/p PCI to RCA 2009   Morbid obesity      From 11/1/2023 to 11/5/2023, patient was hospitalized for left gangrenous heel wound.          On 11/16/2023, patient's glargine was increased to 44 units/day.     On 11/20/2023, patient's glargine was increased to 48 units daily, her mealtime insulin was increased to 10 units 3 times daily.  She was started on melatonin     on 11/28/2023 her mealtime insulin was increased to 14 units 3 times daily.     On 11/29/2023, patient saw Dr. Hamilton.  Her ruma should remain in.  Continue with daily dressing changes and wearing her IPOP at all times.  She remains nonweightbearing on her left lower extremity.  Follow-up in 2 weeks     On 12/8/2023, she was hospitalized for GI bleed.  Her CT angio this afternoon which showed area of bleeding in the descending/sigmoid colon junction with diverticulosis and without evidence of diverticulitis.  Atherosclerotic disease of the abdominal aorta.   Hgb initially was 10.    GI recommended admission for colonoscopy. Received 2u pRBC in ED, due to ongoing BRBPR. Initially admitted to SICU.  Bleeding since stopped and colonoscopy 12/9/23 showed no evidence of brisk ongoing bleeding. Small polyps were removed from sigmoid and descending with  pathology pending.  Suspected spontaneous resolution of diverticular bleeding. Plavix and ASA restarted      On 12/15/2023, she was treated for UTI     On 12/18/23, her potassium was 3.3 and replaced.          Current BS       Today her weight is up  VS stable.   Incision healed.         Current Outpatient Medications   Medication Sig Dispense Refill    acetaminophen (TYLENOL) 500 MG tablet Take 1,000 mg by mouth 3 times daily      albuterol (PROAIR HFA/PROVENTIL HFA/VENTOLIN HFA) 108 (90 Base) MCG/ACT inhaler Inhale 2 puffs into the lungs      arginine (ARGINAID) PACK Take 1 packet by mouth 3 times daily      aspirin (ASPIR-81) 81 MG EC tablet Take 81 mg by mouth daily.      atorvastatin (LIPITOR) 80 MG tablet Take 40 mg by mouth daily      carvedilol (COREG) 12.5 MG tablet Take 12.5 mg by mouth 2 times daily (with meals)      clopidogrel (PLAVIX) 75 MG tablet Take 75 mg by mouth daily      furosemide (LASIX) 20 MG tablet Take 20 mg by mouth 2 times daily      gabapentin (NEURONTIN) 800 MG tablet Take 800 mg by mouth 3 times daily      hydrochlorothiazide (HYDRODIURIL) 25 MG tablet Take 1 tablet by mouth daily      insulin aspart (NOVOLOG FLEXPEN) 100 UNIT/ML pen Inject Subcutaneous 3 times daily (with meals)  if 0 - 119 = 0 units;   120 - 149 = 1 unit;   150 - 199 = 2 units;   200 - 249 = 4 units;   250 - 299 = 6 Units;   300 - 349 = 8 units;   350 - 999 = 10 units,    At at bedtime   if 0 - 149 = 0 units;   150 - 199 = 1 unit;   200 - 249 = 2 units;   250 - 299 = 3 units;   300 - 349 = 4 units;   350 - 999 = 5 units      Insulin Aspart FlexPen 100 UNIT/ML SOPN Inject 30 Units Subcutaneous 3 times daily (with meals)      insulin glargine (LANTUS PEN) 100 UNIT/ML pen Inject 55 Units Subcutaneous at bedtime      loperamide (IMODIUM) 2 MG capsule Take 2 mg by mouth      melatonin 3 MG tablet Take 1 tablet (3 mg) by mouth at bedtime      Multiple Vitamins-Minerals (JUAN MULTI WOMEN) TBCR Take  by mouth daily.       "nitroglycerin (NITROSTAT) 0.4 MG SL tablet Place  under the tongue every 5 minutes as needed.      nystatin (MYCOSTATIN) 466304 UNIT/GM external powder Apply to skin twice a day.      polyethylene glycol (MIRALAX) 17 g packet Take 17 g by mouth      potassium chloride ER (KLOR-CON M) 20 MEQ CR tablet Take 2 tablets (40 mEq) by mouth daily      semaglutide (OZEMPIC) 2 MG/3ML pen Inject 0.25 mg Subcutaneous every 7 days      semaglutide (OZEMPIC) 2 MG/3ML pen       senna-docusate (SENOKOT-S/PERICOLACE) 8.6-50 MG tablet Take 1 tablet by mouth 2 times daily as needed for constipation      blood glucose (ONETOUCH VERIO IQ) test strip Test 4 times per day ** Pharmacy allowed to substitute per Patients Insurance         ALLERGIES:Chicken-derived products (egg), Lisinopril, Losartan, Miconazole, Morphine and related, Vicodin [hydrocodone-acetaminophen], Egg white (egg protein), and Hydrocodone-acetaminophen    Vitals:  /64   Pulse 80   Temp 97.7  F (36.5  C)   Resp 18   Ht 1.651 m (5' 5\")   Wt 141.5 kg (312 lb)   SpO2 94%   BMI 51.92 kg/m    Body mass index is 51.92 kg/m .    Review of Systems   All other systems reviewed and are negative.         Physical Exam  Vitals and nursing note reviewed.   Cardiovascular:      Rate and Rhythm: Normal rate.   Pulmonary:      Effort: Pulmonary effort is normal.      Breath sounds: Normal breath sounds.   Neurological:      General: No focal deficit present.      Mental Status: She is oriented to person, place, and time.   Psychiatric:         Mood and Affect: Mood normal.         Behavior: Behavior normal.         Thought Content: Thought content normal.           Labs:     Most Recent 3 CBC's:  Recent Labs   Lab Test 12/18/23  0545 11/15/23  0611   WBC 8.7 9.8   HGB 8.6* 10.5*   MCV 92 91    334     Most Recent 3 BMP's:  Recent Labs   Lab Test 12/18/23  0545 12/06/23  0645 11/15/23  0611    140 138   POTASSIUM 3.3* 3.5 3.5   CHLORIDE 94* 97* 94*   CO2 30* " 30* 32*   BUN 20.4 18.0 15.5   CR 0.91 0.87 0.93   ANIONGAP 13 13 12   ROBINA 9.8 9.2 8.9   * 155* 186*         Most Recent Hemoglobin A1c:  Recent Labs   Lab Test 11/15/23  0611   A1C 9.2*         Assessment/Plan:  (E11.52,  Z79.4) Type 2 diabetes mellitus with diabetic peripheral angiopathy and gangrene, with long-term current use of insulin (H)  Comment: Chronic.  Currently taking Semaglutide and will need to increase in a few weeks , insulin Lantus and aspart with sliding scale.     For primary prevention she is taking aspirin and statin,  she is allergic to ACEI and ARBS.    Plan:   Increase semaglutide to 0.5 in 4 weeks after starting              (E87.6) Hypokalemia  (primary encounter diagnosis)  (I50.30) Heart failure with preserved ejection fraction, NYHA class II (H)  Comment: Chronic.   Currently taking furosemide and hydrochlorothiazide.      Potassium 40 mEq daily  Plan:   BMP and CBC and hgb A1c             Electronically signed by:      ALONZO Romero CNP       MEDICATIONS:  MED REC REQUIRED  Post Medication Reconciliation Status:  Discharge medications reconciled and changed, see notes/orders

## 2024-01-11 NOTE — TELEPHONE ENCOUNTER
Prior Authorization Approval    Authorization Effective Date: 1/1/2024  Authorization Expiration Date: 12/31/2024  Medication: Ozempic (0.25 or 0.5MG/DOSE) 2MG/3ML Pen-injectors - APPROVED  Approved Dose/Quantity:    Reference #:     Insurance Company: [employer] - Phone [phone] Fax [phone]  Expected CoPay:       CoPay Card Available:      Foundation Assistance Needed:    Which Pharmacy is filling the prescription (Not needed for infusion/clinic administered): [facility] - [place], [place] - [address] [place] [address]  Pharmacy Notified:  YES  Patient Notified:  YES  **Instructed pharmacy to notify patient when script is ready to /ship.**

## 2024-01-11 NOTE — TELEPHONE ENCOUNTER
[facility]ail Pharmacy Prior Authorization Team   Phone: [phone]    PA Initiation    Medication: Ozempic (0.25 or 0.5MG/DOSE) 2MG/3ML Pen-injectors  Insurance Company: [employer] - Phone [phone] Fax [phone]  Pharmacy Filling the Rx: [facility] [place], [place] - [address] [facility]  Filling Pharmacy Phone: [phone]  Filling Pharmacy Fax: [phone]  Start Date: 1/11/2024

## 2024-01-21 RX ORDER — INSULIN ASPART 100 [IU]/ML
30 INJECTION, SOLUTION INTRAVENOUS; SUBCUTANEOUS
Start: 2024-01-21

## 2024-01-22 NOTE — PROGRESS NOTES
Nahed Courtney  1955      Orders  CMP, CBC,lipid levels, hgb A1c and vitamin D level next lab day.   Dx DM2    Yadira Juarez, ALONZO CNP on 1/21/2024 at 7:18 PM

## 2024-02-03 ENCOUNTER — LAB REQUISITION (OUTPATIENT)
Dept: LAB | Facility: CLINIC | Age: 69
End: 2024-02-03
Payer: COMMERCIAL

## 2024-02-03 DIAGNOSIS — N39.0 URINARY TRACT INFECTION, SITE NOT SPECIFIED: ICD-10-CM

## 2024-02-03 DIAGNOSIS — R53.1 WEAKNESS: ICD-10-CM

## 2024-02-03 LAB
ALBUMIN UR-MCNC: NEGATIVE MG/DL
APPEARANCE UR: ABNORMAL
BACTERIA #/AREA URNS HPF: ABNORMAL /HPF
BILIRUB UR QL STRIP: NEGATIVE
COLOR UR AUTO: ABNORMAL
GLUCOSE UR STRIP-MCNC: NEGATIVE MG/DL
HGB UR QL STRIP: NEGATIVE
KETONES UR STRIP-MCNC: NEGATIVE MG/DL
LEUKOCYTE ESTERASE UR QL STRIP: ABNORMAL
MUCOUS THREADS #/AREA URNS LPF: PRESENT /LPF
NITRATE UR QL: NEGATIVE
PH UR STRIP: 5.5 [PH] (ref 5–7)
RBC URINE: 4 /HPF
SP GR UR STRIP: 1.01 (ref 1–1.03)
SQUAMOUS EPITHELIAL: 2 /HPF
UROBILINOGEN UR STRIP-MCNC: NORMAL MG/DL
WBC URINE: 12 /HPF

## 2024-02-03 PROCEDURE — 87086 URINE CULTURE/COLONY COUNT: CPT | Mod: ORL | Performed by: INTERNAL MEDICINE

## 2024-02-03 PROCEDURE — 81001 URINALYSIS AUTO W/SCOPE: CPT | Mod: ORL | Performed by: INTERNAL MEDICINE

## 2024-02-04 LAB — BACTERIA UR CULT: NORMAL

## 2024-02-20 ENCOUNTER — LAB REQUISITION (OUTPATIENT)
Dept: LAB | Facility: CLINIC | Age: 69
End: 2024-02-20
Payer: COMMERCIAL

## 2024-02-20 DIAGNOSIS — I10 ESSENTIAL (PRIMARY) HYPERTENSION: ICD-10-CM

## 2024-02-20 DIAGNOSIS — E11.9 TYPE 2 DIABETES MELLITUS WITHOUT COMPLICATIONS (H): ICD-10-CM

## 2024-02-21 LAB
ANION GAP SERPL CALCULATED.3IONS-SCNC: 13 MMOL/L (ref 7–15)
BUN SERPL-MCNC: 15.6 MG/DL (ref 8–23)
CALCIUM SERPL-MCNC: 9.1 MG/DL (ref 8.8–10.2)
CHLORIDE SERPL-SCNC: 94 MMOL/L (ref 98–107)
CREAT SERPL-MCNC: 0.9 MG/DL (ref 0.51–0.95)
DEPRECATED HCO3 PLAS-SCNC: 33 MMOL/L (ref 22–29)
EGFRCR SERPLBLD CKD-EPI 2021: 70 ML/MIN/1.73M2
ERYTHROCYTE [DISTWIDTH] IN BLOOD BY AUTOMATED COUNT: 17.5 % (ref 10–15)
GLUCOSE SERPL-MCNC: 188 MG/DL (ref 70–99)
HBA1C MFR BLD: 9 %
HCT VFR BLD AUTO: 30.9 % (ref 35–47)
HGB BLD-MCNC: 9.4 G/DL (ref 11.7–15.7)
MCH RBC QN AUTO: 27.6 PG (ref 26.5–33)
MCHC RBC AUTO-ENTMCNC: 30.4 G/DL (ref 31.5–36.5)
MCV RBC AUTO: 91 FL (ref 78–100)
PLATELET # BLD AUTO: 247 10E3/UL (ref 150–450)
POTASSIUM SERPL-SCNC: 3.4 MMOL/L (ref 3.4–5.3)
RBC # BLD AUTO: 3.4 10E6/UL (ref 3.8–5.2)
SODIUM SERPL-SCNC: 140 MMOL/L (ref 135–145)
WBC # BLD AUTO: 9.2 10E3/UL (ref 4–11)

## 2024-02-21 PROCEDURE — 36415 COLL VENOUS BLD VENIPUNCTURE: CPT | Mod: ORL | Performed by: NURSE PRACTITIONER

## 2024-02-21 PROCEDURE — 85027 COMPLETE CBC AUTOMATED: CPT | Mod: ORL | Performed by: NURSE PRACTITIONER

## 2024-02-21 PROCEDURE — 80048 BASIC METABOLIC PNL TOTAL CA: CPT | Mod: ORL | Performed by: NURSE PRACTITIONER

## 2024-02-21 PROCEDURE — P9604 ONE-WAY ALLOW PRORATED TRIP: HCPCS | Mod: ORL | Performed by: NURSE PRACTITIONER

## 2024-02-21 PROCEDURE — 83036 HEMOGLOBIN GLYCOSYLATED A1C: CPT | Mod: ORL | Performed by: NURSE PRACTITIONER

## 2024-03-26 ENCOUNTER — LAB REQUISITION (OUTPATIENT)
Dept: LAB | Facility: CLINIC | Age: 69
End: 2024-03-26
Payer: COMMERCIAL

## 2024-03-26 DIAGNOSIS — R30.0 DYSURIA: ICD-10-CM

## 2024-03-26 LAB
ALBUMIN UR-MCNC: NEGATIVE MG/DL
APPEARANCE UR: CLEAR
BILIRUB UR QL STRIP: NEGATIVE
COLOR UR AUTO: NORMAL
GLUCOSE UR STRIP-MCNC: NEGATIVE MG/DL
HGB UR QL STRIP: NEGATIVE
KETONES UR STRIP-MCNC: NEGATIVE MG/DL
LEUKOCYTE ESTERASE UR QL STRIP: NEGATIVE
NITRATE UR QL: NEGATIVE
PH UR STRIP: 6.5 [PH] (ref 5–7)
RBC URINE: <1 /HPF
SP GR UR STRIP: 1.01 (ref 1–1.03)
SQUAMOUS EPITHELIAL: <1 /HPF
UROBILINOGEN UR STRIP-MCNC: NORMAL MG/DL
WBC URINE: 0 /HPF

## 2024-03-26 PROCEDURE — 81001 URINALYSIS AUTO W/SCOPE: CPT | Mod: ORL | Performed by: NURSE PRACTITIONER

## 2024-03-27 ENCOUNTER — LAB REQUISITION (OUTPATIENT)
Dept: LAB | Facility: CLINIC | Age: 69
End: 2024-03-27
Payer: COMMERCIAL

## 2024-03-27 DIAGNOSIS — E11.9 TYPE 2 DIABETES MELLITUS WITHOUT COMPLICATIONS (H): ICD-10-CM

## 2024-03-28 LAB
ANION GAP SERPL CALCULATED.3IONS-SCNC: 12 MMOL/L (ref 7–15)
BUN SERPL-MCNC: 16.6 MG/DL (ref 8–23)
CALCIUM SERPL-MCNC: 9.1 MG/DL (ref 8.8–10.2)
CHLORIDE SERPL-SCNC: 95 MMOL/L (ref 98–107)
CREAT SERPL-MCNC: 0.99 MG/DL (ref 0.51–0.95)
DEPRECATED HCO3 PLAS-SCNC: 34 MMOL/L (ref 22–29)
EGFRCR SERPLBLD CKD-EPI 2021: 63 ML/MIN/1.73M2
GLUCOSE SERPL-MCNC: 136 MG/DL (ref 70–99)
POTASSIUM SERPL-SCNC: 3.3 MMOL/L (ref 3.4–5.3)
SODIUM SERPL-SCNC: 141 MMOL/L (ref 135–145)

## 2024-03-28 PROCEDURE — 36415 COLL VENOUS BLD VENIPUNCTURE: CPT | Mod: ORL | Performed by: NURSE PRACTITIONER

## 2024-03-28 PROCEDURE — 80048 BASIC METABOLIC PNL TOTAL CA: CPT | Mod: ORL | Performed by: NURSE PRACTITIONER

## 2024-03-28 PROCEDURE — P9603 ONE-WAY ALLOW PRORATED MILES: HCPCS | Mod: ORL | Performed by: NURSE PRACTITIONER

## 2024-04-11 ENCOUNTER — LAB REQUISITION (OUTPATIENT)
Dept: LAB | Facility: CLINIC | Age: 69
End: 2024-04-11
Payer: COMMERCIAL

## 2024-04-11 DIAGNOSIS — I10 ESSENTIAL (PRIMARY) HYPERTENSION: ICD-10-CM

## 2024-04-12 ENCOUNTER — LAB REQUISITION (OUTPATIENT)
Dept: LAB | Facility: CLINIC | Age: 69
End: 2024-04-12
Payer: COMMERCIAL

## 2024-04-12 DIAGNOSIS — E87.6 HYPOKALEMIA: ICD-10-CM

## 2024-04-12 LAB
ANION GAP SERPL CALCULATED.3IONS-SCNC: 12 MMOL/L (ref 7–15)
BUN SERPL-MCNC: 19.4 MG/DL (ref 8–23)
CALCIUM SERPL-MCNC: 8.9 MG/DL (ref 8.8–10.2)
CHLORIDE SERPL-SCNC: 96 MMOL/L (ref 98–107)
CREAT SERPL-MCNC: 0.93 MG/DL (ref 0.51–0.95)
DEPRECATED HCO3 PLAS-SCNC: 31 MMOL/L (ref 22–29)
EGFRCR SERPLBLD CKD-EPI 2021: 67 ML/MIN/1.73M2
GLUCOSE SERPL-MCNC: 215 MG/DL (ref 70–99)
POTASSIUM SERPL-SCNC: 3.9 MMOL/L (ref 3.4–5.3)
SODIUM SERPL-SCNC: 139 MMOL/L (ref 135–145)

## 2024-04-12 PROCEDURE — 36415 COLL VENOUS BLD VENIPUNCTURE: CPT | Mod: ORL | Performed by: NURSE PRACTITIONER

## 2024-04-12 PROCEDURE — P9604 ONE-WAY ALLOW PRORATED TRIP: HCPCS | Mod: ORL | Performed by: NURSE PRACTITIONER

## 2024-04-12 PROCEDURE — 80048 BASIC METABOLIC PNL TOTAL CA: CPT | Mod: ORL | Performed by: NURSE PRACTITIONER

## 2024-04-16 ENCOUNTER — LAB REQUISITION (OUTPATIENT)
Dept: LAB | Facility: CLINIC | Age: 69
End: 2024-04-16
Payer: COMMERCIAL

## 2024-04-16 DIAGNOSIS — R05.9 COUGH, UNSPECIFIED: ICD-10-CM

## 2024-04-23 ENCOUNTER — LAB REQUISITION (OUTPATIENT)
Dept: LAB | Facility: CLINIC | Age: 69
End: 2024-04-23
Payer: COMMERCIAL

## 2024-04-23 DIAGNOSIS — I50.9 HEART FAILURE, UNSPECIFIED (H): ICD-10-CM

## 2024-04-24 LAB
ANION GAP SERPL CALCULATED.3IONS-SCNC: 11 MMOL/L (ref 7–15)
BUN SERPL-MCNC: 26.1 MG/DL (ref 8–23)
CALCIUM SERPL-MCNC: 9.3 MG/DL (ref 8.8–10.2)
CHLORIDE SERPL-SCNC: 97 MMOL/L (ref 98–107)
CREAT SERPL-MCNC: 1.17 MG/DL (ref 0.51–0.95)
DEPRECATED HCO3 PLAS-SCNC: 32 MMOL/L (ref 22–29)
EGFRCR SERPLBLD CKD-EPI 2021: 51 ML/MIN/1.73M2
GLUCOSE SERPL-MCNC: 131 MG/DL (ref 70–99)
POTASSIUM SERPL-SCNC: 3.8 MMOL/L (ref 3.4–5.3)
SODIUM SERPL-SCNC: 140 MMOL/L (ref 135–145)

## 2024-04-24 PROCEDURE — P9603 ONE-WAY ALLOW PRORATED MILES: HCPCS | Mod: ORL | Performed by: NURSE PRACTITIONER

## 2024-04-24 PROCEDURE — 36415 COLL VENOUS BLD VENIPUNCTURE: CPT | Mod: ORL | Performed by: NURSE PRACTITIONER

## 2024-04-24 PROCEDURE — 80048 BASIC METABOLIC PNL TOTAL CA: CPT | Mod: ORL | Performed by: NURSE PRACTITIONER

## 2024-06-10 RX ORDER — FUROSEMIDE 20 MG
TABLET ORAL
Qty: 28 TABLET | OUTPATIENT
Start: 2024-06-10

## 2024-06-14 ENCOUNTER — OFFICE VISIT (OUTPATIENT)
Dept: FAMILY MEDICINE | Facility: CLINIC | Age: 69
End: 2024-06-14

## 2024-06-14 VITALS — OXYGEN SATURATION: 92 % | HEART RATE: 85 BPM | DIASTOLIC BLOOD PRESSURE: 65 MMHG | SYSTOLIC BLOOD PRESSURE: 142 MMHG

## 2024-06-14 DIAGNOSIS — I10 ESSENTIAL HYPERTENSION: ICD-10-CM

## 2024-06-14 DIAGNOSIS — H65.02 NON-RECURRENT ACUTE SEROUS OTITIS MEDIA OF LEFT EAR: Primary | ICD-10-CM

## 2024-06-14 PROCEDURE — G2211 COMPLEX E/M VISIT ADD ON: HCPCS

## 2024-06-14 PROCEDURE — 99203 OFFICE O/P NEW LOW 30 MIN: CPT

## 2024-06-14 RX ORDER — TRAMADOL HYDROCHLORIDE 50 MG/1
25 TABLET ORAL EVERY 6 HOURS PRN
COMMUNITY
Start: 2024-03-16

## 2024-06-14 NOTE — PROGRESS NOTES
"  Assessment & Plan     Non-recurrent acute serous otitis media of left ear  Mild clear effusion noted on exam to left TM. We discussed the diagnosis, pathophysiology and natural history. Discussed OTC symptoms relief including saline rinses and OTC steroid nasal spray. Anticipatory guidance reviewed. Red flags that warrant emergent evaluation discussed. Discussed following up as needed for new or worsening symptoms or if symptoms fail to improve. Patient agreeable to plan. All questions answered.       Essential hypertension  Blood pressure was elevated at today's visit. Recommendations include monitoring blood pressures over the next 1-2 weeks and if blood pressure continues to be elevated over 140/90 recommend follow up for a blood pressure management visit or follow up with [facility] provider that is currently managing patient care at facility. Discussed importance of a healthy weight, along with diet and exercise. Patient agreeable to plan.               BMI  Estimated body mass index is 51.22 kg/m  as calculated from the following:    Height as of 1/31/24: 1.651 m (5' 5\").    Weight as of 1/31/24: 139.6 kg (307 lb 12.8 oz).   Weight management plan: Discussed healthy diet and exercise guidelines      Work on weight loss  Regular exercise  See Patient Instructions    Return if symptoms worsen or fail to improve, for Follow up.    Kailey Dockery is a 68 year old, presenting for the following health issues:  Ear Problem (L ear feels plugged), Diabetes (Had Dm check yesterday at [facility] /A1c was 8.0), and Establish Care (Pt has medical forms from [facility])    HPI     Lives at [facility]. Follows with providers at the [facility] for her care. Googled there clinic and coming to be seen for an acute visit regarding her left ear. Her ride will also be picking her up soon and is limited on time regarding visit. She states she is switching care over to [facility] for her health " care from [facility]. Follows with endocrine for type 2 DM management and was seen yesterday. No further concerns other than her left ear today.     Concern - Ear problem  Onset: 4 months  Description: recent cold symptom with morning congestion. Now ongoing left ear pain. Congestion still in mornings  Intensity: no pain but 10/10 in jaw. Crackling in ear  Progression of Symptoms:  same  Accompanying Signs & Symptoms: crackling in ear and jaw pain. Okay in the mornings.   Previous history of similar problem: going on for 4 months, no improvement  Precipitating factors:        Worsened by: none  Alleviating factors:        Improved by: none  Therapies tried and outcome: Flonase and saline rinses. Tried flushing it out at her facility without improvement in symptoms        Review of Systems  Constitutional, HEENT, cardiovascular, pulmonary, GI, , musculoskeletal, neuro, skin, endocrine and psych systems are negative, except as otherwise noted.      Objective    BP (!) 142/65   Pulse 85   SpO2 92%   There is no height or weight on file to calculate BMI.  Physical Exam   GENERAL: alert and no distress sitting in wheelchair  HENT: normal cephalic/atraumatic, left ear: clear effusion, nose and mouth without ulcers or lesions, oropharynx clear, and oral mucous membranes moist  RESP: lungs clear to auscultation - no rales, rhonchi or wheezes  CV: regular rate and rhythm, normal S1 S2, no S3 or S4, no murmur, click or rub, no peripheral edema  PSYCH: mentation appears normal, affect normal/bright          Signed Electronically by: ALONZO Castillo CNP

## 2024-06-17 RX ORDER — INSULIN ADMIN. SUPPLIES
INSULIN PEN (EA) SUBCUTANEOUS
OUTPATIENT
Start: 2024-06-17

## 2024-06-26 ENCOUNTER — LAB REQUISITION (OUTPATIENT)
Dept: LAB | Facility: CLINIC | Age: 69
End: 2024-06-26
Payer: COMMERCIAL
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
## 2024-06-27 ENCOUNTER — HOSPITAL ENCOUNTER (OUTPATIENT)
Facility: CLINIC | Age: 69
Discharge: HOME | End: 2024-06-27
Admitting: NURSE PRACTITIONER
Payer: COMMERCIAL

## 2024-06-27 LAB
HBV SURFACE AB SERPL IA-ACNC: <3.5 M[IU]/ML
HBV SURFACE AB SERPL IA-ACNC: NONREACTIVE M[IU]/ML
HBV SURFACE AG SERPL QL IA: NONREACTIVE
HCV AB SERPL QL IA: NONREACTIVE
HIV 1+2 AB+HIV1 P24 AG SERPL QL IA: NONREACTIVE

## 2024-06-27 PROCEDURE — 87340 HEPATITIS B SURFACE AG IA: CPT | Performed by: NURSE PRACTITIONER

## 2024-06-27 PROCEDURE — 86706 HEP B SURFACE ANTIBODY: CPT | Performed by: NURSE PRACTITIONER

## 2024-06-27 PROCEDURE — 86803 HEPATITIS C AB TEST: CPT | Performed by: NURSE PRACTITIONER

## 2024-06-27 PROCEDURE — 86481 TB AG RESPONSE T-CELL SUSP: CPT | Performed by: NURSE PRACTITIONER

## 2024-06-27 PROCEDURE — 36415 COLL VENOUS BLD VENIPUNCTURE: CPT | Performed by: NURSE PRACTITIONER

## 2024-06-27 PROCEDURE — 87389 HIV-1 AG W/HIV-1&-2 AB AG IA: CPT | Performed by: NURSE PRACTITIONER

## 2024-06-27 PROCEDURE — P9604 ONE-WAY ALLOW PRORATED TRIP: HCPCS | Performed by: NURSE PRACTITIONER

## 2024-06-28 LAB
QUANTIFERON MITOGEN: 10 IU/ML
QUANTIFERON NIL TUBE: 0.03 IU/ML
QUANTIFERON TB1 TUBE: 0.03 IU/ML
QUANTIFERON TB2 TUBE: 0.03

## 2024-06-29 LAB
GAMMA INTERFERON BACKGROUND BLD IA-ACNC: 0.03 IU/ML
M TB IFN-G BLD-IMP: NEGATIVE
M TB IFN-G CD4+ BCKGRND COR BLD-ACNC: 9.97 IU/ML
MITOGEN IGNF BCKGRD COR BLD-ACNC: 0 IU/ML
MITOGEN IGNF BCKGRD COR BLD-ACNC: 0 IU/ML

## 2024-07-24 ENCOUNTER — HOSPITAL ENCOUNTER (OUTPATIENT)
Facility: CLINIC | Age: 69
End: 2024-07-24
Payer: COMMERCIAL

## 2025-01-22 ENCOUNTER — LAB REQUISITION (OUTPATIENT)
Dept: LAB | Facility: CLINIC | Age: 70
End: 2025-01-22
Payer: COMMERCIAL

## 2025-01-22 DIAGNOSIS — I50.42 CHRONIC COMBINED SYSTOLIC (CONGESTIVE) AND DIASTOLIC (CONGESTIVE) HEART FAILURE (H): ICD-10-CM

## 2025-01-22 DIAGNOSIS — E11.10 TYPE 2 DIABETES MELLITUS WITH KETOACIDOSIS WITHOUT COMA (H): ICD-10-CM

## 2025-01-22 DIAGNOSIS — Z71.89 OTHER SPECIFIED COUNSELING: ICD-10-CM

## 2025-01-22 DIAGNOSIS — I10 ESSENTIAL (PRIMARY) HYPERTENSION: ICD-10-CM

## 2025-01-22 DIAGNOSIS — J44.89 OTHER SPECIFIED CHRONIC OBSTRUCTIVE PULMONARY DISEASE (H): ICD-10-CM

## 2025-01-22 DIAGNOSIS — Z89.512 ACQUIRED ABSENCE OF LEFT LEG BELOW KNEE (H): ICD-10-CM

## 2025-01-22 DIAGNOSIS — E78.2 MIXED HYPERLIPIDEMIA: ICD-10-CM

## 2025-01-29 ENCOUNTER — LAB REQUISITION (OUTPATIENT)
Dept: LAB | Facility: CLINIC | Age: 70
End: 2025-01-29
Payer: COMMERCIAL

## 2025-01-29 DIAGNOSIS — R30.0 DYSURIA: ICD-10-CM

## 2025-01-29 LAB
ALBUMIN UR-MCNC: NEGATIVE MG/DL
APPEARANCE UR: CLEAR
BACTERIA #/AREA URNS HPF: ABNORMAL /HPF
BILIRUB UR QL STRIP: NEGATIVE
COLOR UR AUTO: ABNORMAL
GLUCOSE UR STRIP-MCNC: NEGATIVE MG/DL
HGB UR QL STRIP: NEGATIVE
KETONES UR STRIP-MCNC: NEGATIVE MG/DL
LEUKOCYTE ESTERASE UR QL STRIP: ABNORMAL
NITRATE UR QL: POSITIVE
PH UR STRIP: 5.5 [PH] (ref 5–7)
RBC URINE: 1 /HPF
SP GR UR STRIP: 1.02 (ref 1–1.03)
SQUAMOUS EPITHELIAL: 1 /HPF
UROBILINOGEN UR STRIP-MCNC: NORMAL MG/DL
WBC URINE: 24 /HPF

## 2025-01-30 LAB — BACTERIA UR CULT: ABNORMAL

## 2025-02-12 LAB
ERYTHROCYTE [DISTWIDTH] IN BLOOD BY AUTOMATED COUNT: 15.8 % (ref 10–15)
EST. AVERAGE GLUCOSE BLD GHB EST-MCNC: 171 MG/DL
HBA1C MFR BLD: 7.6 %
HCT VFR BLD AUTO: 36.6 % (ref 35–47)
HGB BLD-MCNC: 11.4 G/DL (ref 11.7–15.7)
MCH RBC QN AUTO: 29.8 PG (ref 26.5–33)
MCHC RBC AUTO-ENTMCNC: 31.1 G/DL (ref 31.5–36.5)
MCV RBC AUTO: 96 FL (ref 78–100)
PLATELET # BLD AUTO: 229 10E3/UL (ref 150–450)
RBC # BLD AUTO: 3.82 10E6/UL (ref 3.8–5.2)
WBC # BLD AUTO: 8.9 10E3/UL (ref 4–11)

## 2025-02-12 PROCEDURE — 36415 COLL VENOUS BLD VENIPUNCTURE: CPT | Mod: ORL

## 2025-02-12 PROCEDURE — P9604 ONE-WAY ALLOW PRORATED TRIP: HCPCS | Mod: ORL

## 2025-02-12 PROCEDURE — 83036 HEMOGLOBIN GLYCOSYLATED A1C: CPT | Mod: ORL

## 2025-02-12 PROCEDURE — 85027 COMPLETE CBC AUTOMATED: CPT | Mod: ORL

## 2025-02-12 PROCEDURE — 80061 LIPID PANEL: CPT | Mod: ORL

## 2025-02-12 PROCEDURE — 80053 COMPREHEN METABOLIC PANEL: CPT | Mod: ORL

## 2025-02-13 LAB
ALBUMIN SERPL BCG-MCNC: 4 G/DL (ref 3.5–5.2)
ALP SERPL-CCNC: 97 U/L (ref 40–150)
ALT SERPL W P-5'-P-CCNC: 34 U/L (ref 0–50)
ANION GAP SERPL CALCULATED.3IONS-SCNC: 14 MMOL/L (ref 7–15)
AST SERPL W P-5'-P-CCNC: 52 U/L (ref 0–45)
BILIRUB SERPL-MCNC: 0.2 MG/DL
BUN SERPL-MCNC: 21.8 MG/DL (ref 8–23)
CALCIUM SERPL-MCNC: 9.5 MG/DL (ref 8.8–10.4)
CHLORIDE SERPL-SCNC: 103 MMOL/L (ref 98–107)
CHOLEST SERPL-MCNC: 167 MG/DL
CREAT SERPL-MCNC: 1.08 MG/DL (ref 0.51–0.95)
EGFRCR SERPLBLD CKD-EPI 2021: 56 ML/MIN/1.73M2
FASTING STATUS PATIENT QL REPORTED: ABNORMAL
HCO3 SERPL-SCNC: 26 MMOL/L (ref 22–29)
HDLC SERPL-MCNC: 45 MG/DL
LDLC SERPL CALC-MCNC: 84 MG/DL
NONHDLC SERPL-MCNC: 122 MG/DL
POTASSIUM SERPL-SCNC: 3.8 MMOL/L (ref 3.4–5.3)
PROT SERPL-MCNC: 7.7 G/DL (ref 6.4–8.3)
SODIUM SERPL-SCNC: 143 MMOL/L (ref 135–145)
TRIGL SERPL-MCNC: 190 MG/DL

## 2025-02-15 LAB — GLUCOSE SERPL-MCNC: 91 MG/DL (ref 70–99)

## 2025-02-19 LAB — HOLD SPECIMEN: NORMAL

## 2025-05-30 ENCOUNTER — LAB REQUISITION (OUTPATIENT)
Dept: LAB | Facility: CLINIC | Age: 70
End: 2025-05-30
Payer: COMMERCIAL

## 2025-05-30 DIAGNOSIS — R30.0 DYSURIA: ICD-10-CM

## 2025-05-30 LAB
ALBUMIN UR-MCNC: NEGATIVE MG/DL
APPEARANCE UR: CLEAR
BACTERIA #/AREA URNS HPF: ABNORMAL /HPF
BILIRUB UR QL STRIP: NEGATIVE
COLOR UR AUTO: ABNORMAL
GLUCOSE UR STRIP-MCNC: NEGATIVE MG/DL
HGB UR QL STRIP: NEGATIVE
KETONES UR STRIP-MCNC: NEGATIVE MG/DL
LEUKOCYTE ESTERASE UR QL STRIP: ABNORMAL
MUCOUS THREADS #/AREA URNS LPF: PRESENT /LPF
NITRATE UR QL: NEGATIVE
PH UR STRIP: 5 [PH] (ref 5–7)
RBC URINE: 1 /HPF
SP GR UR STRIP: 1.01 (ref 1–1.03)
SQUAMOUS EPITHELIAL: 3 /HPF
UROBILINOGEN UR STRIP-MCNC: NORMAL MG/DL
WBC URINE: 6 /HPF

## 2025-05-30 PROCEDURE — 87186 SC STD MICRODIL/AGAR DIL: CPT | Mod: ORL

## 2025-05-30 PROCEDURE — 81001 URINALYSIS AUTO W/SCOPE: CPT | Mod: ORL

## 2025-05-31 LAB
BACTERIA UR CULT: ABNORMAL
BACTERIA UR CULT: ABNORMAL